# Patient Record
Sex: FEMALE | Race: WHITE | Employment: OTHER | URBAN - NONMETROPOLITAN AREA
[De-identification: names, ages, dates, MRNs, and addresses within clinical notes are randomized per-mention and may not be internally consistent; named-entity substitution may affect disease eponyms.]

---

## 2019-06-03 ENCOUNTER — HOSPITAL ENCOUNTER (OUTPATIENT)
Age: 59
Setting detail: OBSERVATION
Discharge: HOME | End: 2019-06-04
Attending: FAMILY MEDICINE | Admitting: FAMILY MEDICINE
Payer: MEDICARE

## 2019-06-03 PROBLEM — G97.1 SPINAL HEADACHE: Status: ACTIVE | Noted: 2019-06-03

## 2019-06-03 LAB
ANION GAP SERPL CALCULATED.3IONS-SCNC: 13 MMOL/L (ref 7–19)
APTT: 25.8 SEC (ref 26–36.2)
BUN BLDV-MCNC: 8 MG/DL (ref 6–20)
CALCIUM SERPL-MCNC: 8.9 MG/DL (ref 8.6–10)
CHLORIDE BLD-SCNC: 111 MMOL/L (ref 98–111)
CO2: 22 MMOL/L (ref 22–29)
CREAT SERPL-MCNC: 0.8 MG/DL (ref 0.5–0.9)
GFR NON-AFRICAN AMERICAN: >60
GLUCOSE BLD-MCNC: 102 MG/DL (ref 74–109)
HCT VFR BLD CALC: 39.9 % (ref 37–47)
HEMOGLOBIN: 12.8 G/DL (ref 12–16)
INR BLD: 1.1 (ref 0.88–1.18)
MCH RBC QN AUTO: 28.8 PG (ref 27–31)
MCHC RBC AUTO-ENTMCNC: 32.1 G/DL (ref 33–37)
MCV RBC AUTO: 89.7 FL (ref 81–99)
PDW BLD-RTO: 13.2 % (ref 11.5–14.5)
PLATELET # BLD: 236 K/UL (ref 130–400)
PMV BLD AUTO: 10 FL (ref 9.4–12.3)
POTASSIUM SERPL-SCNC: 3.8 MMOL/L (ref 3.5–5)
PROTHROMBIN TIME: 13.6 SEC (ref 12–14.6)
RBC # BLD: 4.45 M/UL (ref 4.2–5.4)
SODIUM BLD-SCNC: 146 MMOL/L (ref 136–145)
TSH SERPL DL<=0.05 MIU/L-ACNC: 3.42 UIU/ML (ref 0.27–4.2)
VITAMIN D 25-HYDROXY: 81.6 NG/ML
WBC # BLD: 9.1 K/UL (ref 4.8–10.8)

## 2019-06-03 PROCEDURE — 36415 COLL VENOUS BLD VENIPUNCTURE: CPT

## 2019-06-03 PROCEDURE — 85730 THROMBOPLASTIN TIME PARTIAL: CPT

## 2019-06-03 PROCEDURE — G0378 HOSPITAL OBSERVATION PER HR: HCPCS

## 2019-06-03 PROCEDURE — 6370000000 HC RX 637 (ALT 250 FOR IP): Performed by: HOSPITALIST

## 2019-06-03 PROCEDURE — 82306 VITAMIN D 25 HYDROXY: CPT

## 2019-06-03 PROCEDURE — 2580000003 HC RX 258: Performed by: PSYCHIATRY & NEUROLOGY

## 2019-06-03 PROCEDURE — 84443 ASSAY THYROID STIM HORMONE: CPT

## 2019-06-03 PROCEDURE — 2500000003 HC RX 250 WO HCPCS: Performed by: PSYCHIATRY & NEUROLOGY

## 2019-06-03 PROCEDURE — 80048 BASIC METABOLIC PNL TOTAL CA: CPT

## 2019-06-03 PROCEDURE — 99226 PR SBSQ OBSERVATION CARE/DAY 35 MINUTES: CPT | Performed by: HOSPITALIST

## 2019-06-03 PROCEDURE — 85610 PROTHROMBIN TIME: CPT

## 2019-06-03 PROCEDURE — 6360000002 HC RX W HCPCS: Performed by: HOSPITALIST

## 2019-06-03 PROCEDURE — 96375 TX/PRO/DX INJ NEW DRUG ADDON: CPT

## 2019-06-03 PROCEDURE — 96365 THER/PROPH/DIAG IV INF INIT: CPT

## 2019-06-03 PROCEDURE — 85027 COMPLETE CBC AUTOMATED: CPT

## 2019-06-03 PROCEDURE — G0379 DIRECT REFER HOSPITAL OBSERV: HCPCS

## 2019-06-03 PROCEDURE — 99220 PR INITIAL OBSERVATION CARE/DAY 70 MINUTES: CPT | Performed by: PSYCHIATRY & NEUROLOGY

## 2019-06-03 RX ORDER — SODIUM CHLORIDE 9 MG/ML
INJECTION, SOLUTION INTRAVENOUS ONCE
Status: COMPLETED | OUTPATIENT
Start: 2019-06-03 | End: 2019-06-03

## 2019-06-03 RX ORDER — BUSPIRONE HYDROCHLORIDE 5 MG/1
10 TABLET ORAL 2 TIMES DAILY
Status: DISCONTINUED | OUTPATIENT
Start: 2019-06-03 | End: 2019-06-04 | Stop reason: HOSPADM

## 2019-06-03 RX ORDER — GABAPENTIN 300 MG/1
300 CAPSULE ORAL 3 TIMES DAILY
Status: DISCONTINUED | OUTPATIENT
Start: 2019-06-03 | End: 2019-06-04 | Stop reason: HOSPADM

## 2019-06-03 RX ORDER — CITALOPRAM 20 MG/1
20 TABLET ORAL DAILY
Status: DISCONTINUED | OUTPATIENT
Start: 2019-06-03 | End: 2019-06-04 | Stop reason: HOSPADM

## 2019-06-03 RX ORDER — LEVOTHYROXINE SODIUM 0.05 MG/1
50 TABLET ORAL DAILY
Status: DISCONTINUED | OUTPATIENT
Start: 2019-06-03 | End: 2019-06-04 | Stop reason: HOSPADM

## 2019-06-03 RX ORDER — ERGOCALCIFEROL 1.25 MG/1
50000 CAPSULE ORAL WEEKLY
Status: DISCONTINUED | OUTPATIENT
Start: 2019-06-03 | End: 2019-06-04 | Stop reason: HOSPADM

## 2019-06-03 RX ORDER — TOPIRAMATE 25 MG/1
50 TABLET ORAL 2 TIMES DAILY
Status: DISCONTINUED | OUTPATIENT
Start: 2019-06-03 | End: 2019-06-04 | Stop reason: HOSPADM

## 2019-06-03 RX ADMIN — LEVOTHYROXINE SODIUM 50 MCG: 50 TABLET ORAL at 17:41

## 2019-06-03 RX ADMIN — GABAPENTIN 300 MG: 300 CAPSULE ORAL at 18:23

## 2019-06-03 RX ADMIN — ERGOCALCIFEROL 50000 UNITS: 1.25 CAPSULE ORAL at 17:41

## 2019-06-03 RX ADMIN — SODIUM CHLORIDE: 9 INJECTION, SOLUTION INTRAVENOUS at 19:00

## 2019-06-03 RX ADMIN — HYDROMORPHONE HYDROCHLORIDE 0.5 MG: 1 INJECTION, SOLUTION INTRAMUSCULAR; INTRAVENOUS; SUBCUTANEOUS at 15:04

## 2019-06-03 RX ADMIN — CITALOPRAM HYDROBROMIDE 20 MG: 20 TABLET ORAL at 17:41

## 2019-06-03 RX ADMIN — CAFFEINE AND SODIUM BENZOATE 500 MG: 125 INJECTION, SOLUTION INTRAMUSCULAR; INTRAVENOUS at 17:41

## 2019-06-03 RX ADMIN — TOPIRAMATE 50 MG: 25 TABLET, FILM COATED ORAL at 21:25

## 2019-06-03 RX ADMIN — GABAPENTIN 300 MG: 300 CAPSULE ORAL at 21:25

## 2019-06-03 RX ADMIN — BUSPIRONE HYDROCHLORIDE 10 MG: 5 TABLET ORAL at 21:25

## 2019-06-03 ASSESSMENT — PAIN SCALES - GENERAL: PAINLEVEL_OUTOF10: 7

## 2019-06-03 NOTE — CONSULTS
swelling or pain. Genitourinary - No difficulty urinating, dysuria  Musculoskeletal - no back pain or myalgia. Skin - no color change or rash  Neurologic - No seizures. No lateralizing weakness. Hematologic - no easy bruising or excessive bleeding. Psychiatric - no severe anxiety or nervousness. All other review of systems are negative. ? Past Medical History:      Diagnosis Date    Arthritis     Depression with anxiety     GERD (gastroesophageal reflux disease)     Hypothyroidism     Migraines      Past Surgical History:      Procedure Laterality Date    BREAST BIOPSY      bilateral biospy = 8 - 9 times    CHOLECYSTECTOMY      COLON SURGERY      scar tissue removed    COLONOSCOPY      ENDOSCOPY, COLON, DIAGNOSTIC      FRACTURE SURGERY      HERNIA REPAIR  05/2016    HYSTERECTOMY      TUBAL LIGATION      UPPER GASTROINTESTINAL ENDOSCOPY       Medications in Hospital:     Current Facility-Administered Medications:     HYDROmorphone (DILAUDID) injection 0.5 mg, 0.5 mg, Intravenous, Q3H PRN, Kitty Fernandez MD, 0.5 mg at 06/03/19 1504    busPIRone (BUSPAR) tablet 10 mg, 10 mg, Oral, BID, Kitty Fernandez MD    citalopram (CELEXA) tablet 20 mg, 20 mg, Oral, Daily, Kitty Fernandez MD    estropipate (OGEN) tablet 0.75 mg, 0.75 mg, Oral, Daily, Kitty Fernandez MD    gabapentin (NEURONTIN) capsule 300 mg, 300 mg, Oral, TID, Kitty Fernandez MD    levothyroxine (SYNTHROID) tablet 50 mcg, 50 mcg, Oral, Daily, Kitty Fernandez MD    topiramate (TOPAMAX) tablet 50 mg, 50 mg, Oral, BID, Kitty Fernandez MD    vitamin D (ERGOCALCIFEROL) capsule 50,000 Units, 50,000 Units, Oral, Weekly, Kitty Fernandez MD  Allergies: Penicillins  Social History:   TOBACCO:  reports that she has never smoked. She has never used smokeless tobacco.  ETOH:  reports that she does not drink alcohol.   Family History:       Problem Relation Age of Onset    Heart Failure Mother            Milagros Borergo Atrial Fibrillation Mother     Cancer Brother         lymphoma    Stroke Maternal Grandmother      ? ? Physical Exam:    Vitals: /80   Pulse 72   Temp 97.7 °F (36.5 °C) (Temporal)   Resp 16   SpO2 93%   Constitutional - well developed, well nourished. Eyes - conjunctiva normal. Pupils react to light  Ear, nose, throat -hearing intact to finger rub No scars, masses, or lesions over external nose or ears, no atrophy of tongue  Neck-symmetric, no masses noted, no jugular vein distension  Respiration- chest wall appears symmetric, good expansion,   normal effort without use of accessory muscles  Musculoskeletal - no significant wasting of muscles noted, no bony deformities  Extremities-no clubbing, cyanosis or edema  Skin - warm, dry, and intact. No rash, erythema, or pallor.   Psychiatric - mood, affect, and behavior appear normal.   Neurological exam  Awake, alert, fluent oriented x 3 appropriate affect  Attention and concentration appear appropriate  Recent and remote memory appears unremarkable  Speech normal without dysarthria  No clear issues with language of fund of knowledge  Cranial Nerve Exam   CN II- Visual fields grossly unremarkable  CN III, IV,VI-EOMI, No nystagmus, conjugate eye movements, no ptosis  CN V-sensation intact to LT over face  CN VII-no facial assymetry  CN VIII-Hearing intact to voice  CN IX and X- Palate elevates in midline  CN XI-good shoulder shrug  CN XII-Tongue midline with no fasciculations or fibrillations  Motor Exam  V/V throughout upper and lower extremities bilaterally, no cogwheeling, normal tone  Sensory Exam  Sensation intact to light touch and temperature upper and lower extremities bilaterally  Reflexes   2+ biceps bilaterally  2+ brachioradialis  2+patella  2+ ankle jerks  No clonus ankles  No Romo's sign bilateral hands  Tremors- no tremors in hands or head noted  Gait  Not tested  Coordination  Finger to nose-unremarkable  ? ?  CBC:   Recent Labs     06/03/19  1307   WBC 9.1   HGB 12.8        BMP:   Recent Labs     06/03/19  1307   *   K 3.8      CO2 22   BUN 8   CREATININE 0.8   GLUCOSE 102     Hepatic: No results for input(s): AST, ALT, ALB, BILITOT, ALKPHOS in the last 72 hours. Lipids: No results for input(s): CHOL, HDL in the last 72 hours. Invalid input(s): LDLCALCU  INR:   Recent Labs     06/03/19  1307   INR 1.10     ?  ? Assessment and Plan   1. Spinal headache post puncture of the dura during an epidural pain block. Her CT scan findings are consistent with that. She otherwise has a nonfocal neurological exam and no signs of meningitis nor any other ominous neurological condition. She will be given IV caffeine tonight and continue on copious IV fluids and pain medication. Should her symptoms be no better in the morning we will try to get the anesthesia service to see her for a blood patch. Discussed with nursing staff and patient and her . This with the ED physician.   ?  ?      Blaze Pike MD  Board Certified in Neurology

## 2019-06-03 NOTE — H&P
History and Physical    Patient Name:  Barbra Aggarwal    :  1960    Chief Complaint:   Headache     History of Present Illness:   Barbra Aggarwal presents to [facility] with 3 day history of headache after epidural for her back pain. Pain in the occipital region, 10 out 10, nothing was making it better, noise and light makes it worse. With assocciated nausea. Pain in sharp and constant. CT of the head was performed. This showed free air in the head consistent with puncture of the dura. She has a history of migraines which are well controlled on Topamax. Her current headache is not like a migraine. There had been no fevers or chills. Denies any neurological deficits. Past Medical History:   has a past medical history of Arthritis, Depression with anxiety, GERD (gastroesophageal reflux disease), Hypothyroidism, and Migraines. Surgical History:   has a past surgical history that includes Colon surgery; hernia repair (2016); Hysterectomy; Cholecystectomy; Upper gastrointestinal endoscopy; Tubal ligation; Colonoscopy; Endoscopy, colon, diagnostic; fracture surgery; and Breast biopsy. Social History:   reports that she has never smoked. She has never used smokeless tobacco. She reports that she does not drink alcohol or use drugs. Family History:  family history includes Atrial Fibrillation in her mother; Cancer in her brother; Heart Failure in her mother; Stroke in her maternal grandmother. Medications:  Prior to Admission medications    Medication Sig Start Date End Date Taking? Authorizing Provider   topiramate (TOPAMAX) 50 MG tablet Take 50 mg by mouth 2 times daily   Yes Historical Provider, MD   citalopram (CELEXA) 20 MG tablet Take 20 mg by mouth daily   Yes Historical Provider, MD   levothyroxine (SYNTHROID) 50 MCG tablet Take 50 mcg by mouth Daily   Yes Historical Provider, MD   gabapentin (NEURONTIN) 300 MG capsule Take 300 mg by mouth 3 times daily.  Patient states that she only takes it twice daily. Yes Historical Provider, MD   estropipate (OGEN) 0.75 MG tablet Take 0.75 mg by mouth daily   Yes Historical Provider, MD   busPIRone (BUSPAR) 10 MG tablet Take 10 mg by mouth 2 times daily   Yes Historical Provider, MD   vitamin D (ERGOCALCIFEROL) 52557 UNITS CAPS capsule Take 50,000 Units by mouth once a week    Historical Provider, MD       Allergies:  Penicillins     Review of Systems:   · Constitutional: there has been no unanticipated weight loss. There's been change in energy level, sleep pattern, or activity level. · Eyes: No visual changes or diplopia. No scleral icterus. · ENT: Headaches, no hearing loss or vertigo. No mouth sores or sore throat. · Cardiovascular: No chest pain, palpitations or loss of consciousness. No pleuritic pain or phlebitis. No orthopnea, PND or peripheral edema. · Respiratory: No cough or wheezing, no sputum production. No hemoptysis. No dyspnea     · Gastrointestinal: No abdominal pain, appetite loss, blood in stools. No change in bowel habits. No V. No blood per rectum. · Genitourinary: No dysuria, trouble voiding, or hematuria. · Musculoskeletal:  No gait disturbance, weakness or joint complaints. · Integumentary: No rash or pruritis. · Neurological: No diplopia, change in muscle strength, numbness or tingling. No change in gait, balance, coordination. · Psychiatric: No anxiety, or depression. · Endocrine: No temperature intolerance. No excessive thirst, fluid intake, or urination. No tremor. · Hematologic/Lymphatic: No abnormal bruising or bleeding, blood clots or swollen lymph nodes. · Allergic/Immunologic: No nasal congestion or hives. Physical Examination:    Vital Signs: /80   Pulse 72   Temp 97.7 °F (36.5 °C) (Temporal)   Resp 16   SpO2 93%   General appearance: Well preserved, mesomorphic body habitus, alert, severe distress. Skin: Skin color, texture, turgor normal. No rashes or lesions.   No induration or tightening palpated. Head: Normocephalic. No masses, lesions, tenderness or abnormalities  Eyes: conjunctivae/corneas clear. EOM's intact. Sclera non icteric. Ears: External ears normal.  Hearing normal to finger rub. Nose/Sinuses: Nares normal. Septum midline. Mucosa normal. No drainage or sinus tenderness. Oropharynx: Lips, mucosa, and tongue normal. Oropharynx clear with no exudate seen. Neck: Neck supple, and symmetric. No adenopathy. Trachea is midline. Carotids brisk in upstroke without bruits, No abnormal JVP noted at 45°. Lungs: Lungs clear to auscultation bilaterally. No retractions or use of accessory muscles. No vocal fremitus. No ronchi, crackle or rale. Heart:  S1 > S2. Regular rate and rhythm. No gallop, murmur, rub, palpable thrill or heave noted. Abdomen: Abdomen soft, non-tender. BS normal. No masses, organomegaly. No hernia noted. Extremities: Extremities normal. No deformities, edema, or skin discoloration. No cyanosis or clubbing noted to the nails. Peripheral pulses 4/4. Musculoskeletal: Spine ROM normal. Muscular strength intact. Neuro: Cranial nerves intact. Motor: Strength 5/5 in all extremities. No focal weakness. Sensory: grossly normal to touch. Pertinent Labs:  CBC:   Recent Labs     06/03/19  1307   WBC 9.1   RBC 4.45   HGB 12.8   HCT 39.9   MCV 89.7   MCH 28.8   MCHC 32.1*   RDW 13.2      MPV 10.0     BMP:   Recent Labs     06/03/19  1307   *   K 3.8      CO2 22   BUN 8   CREATININE 0.8   GLUCOSE 102   CALCIUM 8.9     ABGs: No results for input(s): PO2, PCO2, PH, HCO3, BE, O2SAT in the last 72 hours.   INR:   Recent Labs     06/03/19  1307   INR 1.10   PROTIME 13.6     BNP:  No results found for: BNP  TSH:   Lab Results   Component Value Date    TSH 3.420 06/03/2019     Cardiac Injury Profile: No results found for: CKTOTAL, CKMB, CKMBINDEX, TROPONINI  Lipid Profile: No components found for: CHLPL  No results found for: TRIG  No results found for: HDL  No results found for: LDLCALC  No results found for: LABVLDL  Hemoglobin A1C: No results found for: LABA1C  No results found for: EAG      Assessment/Plan:  · Spinal headache- per neuro caffeine today, dilaudid, hydration- if not better blood patch am   · Migraines - home meds                     I have reviewed my findings and recommendations in detail with Alejandro Esteban.     Shreya Varner MD

## 2019-06-04 VITALS
DIASTOLIC BLOOD PRESSURE: 77 MMHG | RESPIRATION RATE: 14 BRPM | OXYGEN SATURATION: 98 % | TEMPERATURE: 96.9 F | HEART RATE: 72 BPM | SYSTOLIC BLOOD PRESSURE: 118 MMHG

## 2019-06-04 PROBLEM — G89.29 CHRONIC LOW BACK PAIN: Status: ACTIVE | Noted: 2019-06-04

## 2019-06-04 PROBLEM — E87.0 HYPERNATREMIA: Status: ACTIVE | Noted: 2019-06-04

## 2019-06-04 PROBLEM — M54.50 CHRONIC LOW BACK PAIN: Status: ACTIVE | Noted: 2019-06-04

## 2019-06-04 PROCEDURE — 99217 PR OBSERVATION CARE DISCHARGE MANAGEMENT: CPT | Performed by: FAMILY MEDICINE

## 2019-06-04 PROCEDURE — 6370000000 HC RX 637 (ALT 250 FOR IP): Performed by: HOSPITALIST

## 2019-06-04 PROCEDURE — 99225 PR SBSQ OBSERVATION CARE/DAY 25 MINUTES: CPT | Performed by: PSYCHIATRY & NEUROLOGY

## 2019-06-04 PROCEDURE — G0378 HOSPITAL OBSERVATION PER HR: HCPCS

## 2019-06-04 RX ADMIN — TOPIRAMATE 50 MG: 25 TABLET, FILM COATED ORAL at 08:59

## 2019-06-04 RX ADMIN — CITALOPRAM HYDROBROMIDE 20 MG: 20 TABLET ORAL at 08:59

## 2019-06-04 RX ADMIN — LEVOTHYROXINE SODIUM 50 MCG: 50 TABLET ORAL at 06:11

## 2019-06-04 RX ADMIN — BUSPIRONE HYDROCHLORIDE 10 MG: 5 TABLET ORAL at 08:59

## 2019-06-04 RX ADMIN — GABAPENTIN 300 MG: 300 CAPSULE ORAL at 08:59

## 2019-06-04 NOTE — DISCHARGE SUMMARY
cooperative with exam  HEENT: atraumatic, eyes with clear conjunctiva and normal lids, pupils and irises normal, external ears and nose are normal, lips normal. Neck without masses, lympadenopathy, bruit, thyroid normal  Lungs: no increased work of breathing, clear to auscultation bilaterally without rales, rhonchi or wheezes  Heart: regular rate and rhythm, S1, S2 normal, no murmur, click, rub or gallop  Abdomen: soft, non-tender; bowel sounds normal; no masses,  no organomegaly  Extremities: extremities normal without clubbing, atraumatic, no cyanosis or edema  Neurologic: no focal neurologic deficits, normal sensation, alert and oriented, affect and mood appropriate  Skin: no jaundice, rashes, or nodules    Discharge Medications:       Quincy Dickinson 855 Medication Instructions RUB:[id number]    Printed on:06/04/19 1007   Medication Information                      busPIRone (BUSPAR) 10 MG tablet  Take 10 mg by mouth 2 times daily             citalopram (CELEXA) 20 MG tablet  Take 20 mg by mouth daily             diclofenac (FLECTOR) 1.3 % patch  Place 1 patch onto the skin 2 times daily for 3 days             estropipate (OGEN) 0.75 MG tablet  Take 0.75 mg by mouth daily             gabapentin (NEURONTIN) 300 MG capsule  Take 300 mg by mouth 3 times daily. Patient states that she only takes it twice daily. levothyroxine (SYNTHROID) 50 MCG tablet  Take 50 mcg by mouth Daily             topiramate (TOPAMAX) 50 MG tablet  Take 50 mg by mouth 2 times daily             vitamin D (ERGOCALCIFEROL) 50777 UNITS CAPS capsule  Take 50,000 Units by mouth once a week                 Discharge Instructions: Follow up with Maria Teresa Alvarado in 3 days. Take medications as directed. Resume activity as tolerated. Diet: DIET GENERAL;     Disposition: Patient is medically stable and will be discharged Home. Time spent on discharge less than 30 minutes.     Signed:  Brook Hart DO

## 2019-06-04 NOTE — PROGRESS NOTES
Patient:   Nawaf Mac  MR#:    [id number]   Room:    [facility]   YOB: 1960  Date of Progress Note: 6/4/2019  Time of Note                           7:34 AM  Consulting Physician:   Mikael Koyanagi, M.D. Attending Physician:  Galina Blakely, DO       CHIEF COMPLAINT: severe headache  ? Subjective  This 66 y.o. female who presents with a severe headache. 4 days ago she had her 1st and only epidural [facility]. When she awoke she had a headache in the occipital region. Since then she continues to get a headache was described as being severe and sharp pain at the back of her head. As long as she is laying still she is fine but when she gets up and starts moving around and becomes severe and causes significant nausea. She is unable to take pain medications orally since she had some type of the stomach surgery in the past. She went to her local ED 6/3 and a CT of the head was performed. This showed free air in the head consistent with puncture of the dura. The transferring provider did not feel her headache was consistent with a spinal headache although her characteristics are quite typical. She denies any focal signs or symptoms. She has a history of migraines which are well controlled on Topamax. Her admission headache is not like a migraine. There had been no fevers or chills. Since getting IV caffeine last evening she has had no recurrence of her postural headache. Was up this morning gone to the bathroom with no difficulty whatsoever. REVIEW OF SYSTEMS:  Constitutional: No fevers No chills  Neck:No stiffness  Respiratory: No shortness of breath  Cardiovascular: No chest pain No palpitations  Gastrointestinal: No abdominal pain    Genitourinary: No Dysuria  Neurological: No headache, no confusion      PHYSICAL EXAM:  /84   Pulse 59   Temp 96.7 °F (35.9 °C) (Temporal)   Resp 14   SpO2 99%     Constitutional: she appears well-developed and well-nourished. Eyes - conjunctiva normal.  Pupils react to light  Ear, nose, throat -hearing intact to voice. No scars, masses, or lesions over external nose or ears, no atrophy of tongue  Neck-symmetric, no masses noted, no jugular vein distension  Respiration- chest wall appears symmetric, good expansion,   normal effort without use of accessory muscles  Cardiovascular- RRR  Musculoskeletal - no significant wasting of muscles noted, no bony deformities, gait no gross ataxia  Extremities-no clubbing, cyanosis or edema  Skin - warm, dry, and intact. No rash, erythema, or pallor. Psychiatric - mood, affect, and behavior appear normal.      Neurology  NEUROLOGICAL EXAM:      Mental status   Awake, alert, fluent oriented x 3 appropriate affect  Attention and concentration appear appropriate  Recent and remote memory appears unremarkable  Speech normal without dysarthria  No clear issues with language       Cranial Nerves   CN II- Visual fields grossly unremarkable  CN III, IV,VI-EOMI, No nystagmus, conjugate eye movements, no ptosis  CN VII-no facial asymmetry  CN VIII-Hearing intact   CN IX and X- Palate elevates in midline  CN XI-good shoulder shrug  CN XII-Tongue midline with no fasciculations or fibrillations          Motor function  Antigravity x 4     Sensory function Intact to light touch     Cerebellar F-N intact     Tremor None present     Gait                  Not tested           Nursing/pcp notes, imaging,labs and vitals reviewed.      PT,OT and/or speech notes reviewed    Lab Results   Component Value Date    WBC 9.1 06/03/2019    HGB 12.8 06/03/2019    HCT 39.9 06/03/2019    MCV 89.7 06/03/2019     06/03/2019     Lab Results   Component Value Date     (H) 06/03/2019    K 3.8 06/03/2019     06/03/2019    CO2 22 06/03/2019    BUN 8 06/03/2019    CREATININE 0.8 06/03/2019    GLUCOSE 102 06/03/2019    CALCIUM 8.9 06/03/2019    PROT 7.5 05/01/2012    LABALBU 4.8 05/01/2012    ALKPHOS 89 05/01/2012 AST 13 05/01/2012    ALT 8 05/01/2012    LABGLOM >60 06/03/2019     Lab Results   Component Value Date    INR 1.10 06/03/2019   No results found for: PHENYTOIN, ESR, CRP          RECORD REVIEW: Previous medical records, medications were reviewed at today's visit    IMPRESSION:   Spinal headache post puncture of the dura during an epidural pain block. Her CT scan findings are consistent with that. She otherwise has a nonfocal neurological exam and no signs of meningitis nor any other ominous neurological condition. Symptoms appear to have resolved following IV caffeine. She will avoid strenuous activity for next couple of days. Discharge home today okay with me. Follow up as needed.

## 2019-06-10 ENCOUNTER — HOSPITAL ENCOUNTER (INPATIENT)
Age: 59
LOS: 4 days | Discharge: HOME | DRG: 885 | End: 2019-06-14
Attending: PSYCHIATRY & NEUROLOGY | Admitting: PSYCHIATRY & NEUROLOGY
Payer: MEDICARE

## 2019-06-10 DIAGNOSIS — F33.2 MAJOR DEPRESSIVE DISORDER, RECURRENT SEVERE WITHOUT PSYCHOTIC FEATURES (HCC): Primary | ICD-10-CM

## 2019-06-10 PROBLEM — R45.851 DEPRESSION WITH SUICIDAL IDEATION: Status: ACTIVE | Noted: 2019-06-10

## 2019-06-10 PROBLEM — F32.A DEPRESSION WITH SUICIDAL IDEATION: Status: ACTIVE | Noted: 2019-06-10

## 2019-06-10 PROCEDURE — 1240000000 HC EMOTIONAL WELLNESS R&B

## 2019-06-10 PROCEDURE — 6370000000 HC RX 637 (ALT 250 FOR IP): Performed by: PSYCHIATRY & NEUROLOGY

## 2019-06-10 RX ORDER — ACETAMINOPHEN 325 MG/1
650 TABLET ORAL EVERY 4 HOURS PRN
Status: DISCONTINUED | OUTPATIENT
Start: 2019-06-10 | End: 2019-06-14 | Stop reason: HOSPADM

## 2019-06-10 RX ORDER — BUSPIRONE HYDROCHLORIDE 10 MG/1
10 TABLET ORAL ONCE
Status: COMPLETED | OUTPATIENT
Start: 2019-06-10 | End: 2019-06-10

## 2019-06-10 RX ORDER — TOPIRAMATE 25 MG/1
50 TABLET ORAL 2 TIMES DAILY
Status: DISCONTINUED | OUTPATIENT
Start: 2019-06-10 | End: 2019-06-14 | Stop reason: HOSPADM

## 2019-06-10 RX ADMIN — TOPIRAMATE 50 MG: 25 TABLET, FILM COATED ORAL at 21:25

## 2019-06-10 RX ADMIN — BUSPIRONE HYDROCHLORIDE 10 MG: 10 TABLET ORAL at 21:25

## 2019-06-10 ASSESSMENT — SLEEP AND FATIGUE QUESTIONNAIRES
SLEEP PATTERN: DIFFICULTY FALLING ASLEEP;RESTLESSNESS
DO YOU USE A SLEEP AID: NO
DO YOU HAVE DIFFICULTY SLEEPING: YES
AVERAGE NUMBER OF SLEEP HOURS: 5
DIFFICULTY FALLING ASLEEP: YES
DIFFICULTY STAYING ASLEEP: YES
RESTFUL SLEEP: NO
DIFFICULTY ARISING: YES

## 2019-06-10 ASSESSMENT — PAIN SCALES - GENERAL: PAINLEVEL_OUTOF10: 0

## 2019-06-10 ASSESSMENT — LIFESTYLE VARIABLES: HISTORY_ALCOHOL_USE: NO

## 2019-06-10 ASSESSMENT — PATIENT HEALTH QUESTIONNAIRE - PHQ9: SUM OF ALL RESPONSES TO PHQ QUESTIONS 1-9: 8

## 2019-06-10 NOTE — PROGRESS NOTES
[facility] Admission From   Nursing Admission Note              Patient Active Problem List   Diagnosis    Family history of coronary artery disease in mother    Family history of coronary artery disease in brother    Abnormal stress test    Ischemic chest pain    Premature ventricular contraction on electrocardiogram    Spinal headache    Chronic low back pain    Hypernatremia    Depression with suicidal ideation       Pt admitted from 's care at [facility]  to [facility] room # 620. Arrived on unit via [facility] with staff escort. Pt appropriately attired in paper scrubs. Body assessment completed by RN with no contraband discovered. All tubes, lines, and drains were appropriately discontinued by RN staff prior to pt transfer to [facility]. Pt belongings and valuables inventoried and cataloged, stored per policy. Pt oriented to surroundings, program expectations, and copy pt rights given. Received admit packet: [facility], Visitation Info, Fall Prevention, Restraints Info. Consents reviewed, signed Pt Rights, Handbook Acceptance, Visit/Call Acceptance, PHI Release, Social Info Release, and Treatment Agreement. Pt verbalizes understanding. Identifies stressors. Pt c/o constant headaches stating \"I would rather die than hurt this bad\".          C/o:    Notes:

## 2019-06-11 PROBLEM — F33.2 MAJOR DEPRESSIVE DISORDER, RECURRENT SEVERE WITHOUT PSYCHOTIC FEATURES (HCC): Status: ACTIVE | Noted: 2019-06-11

## 2019-06-11 LAB
CHOLESTEROL, TOTAL: 165 MG/DL (ref 160–199)
HBA1C MFR BLD: 5.2 % (ref 4–6)
HDLC SERPL-MCNC: 56 MG/DL (ref 65–121)
LDL CHOLESTEROL CALCULATED: 94 MG/DL
TRIGL SERPL-MCNC: 75 MG/DL (ref 0–149)
TSH REFLEX FT4: 3.47 UIU/ML (ref 0.35–5.5)
VITAMIN B-12: 313 PG/ML (ref 211–946)
VITAMIN D 25-HYDROXY: 92.2 NG/ML

## 2019-06-11 PROCEDURE — 80061 LIPID PANEL: CPT

## 2019-06-11 PROCEDURE — 6370000000 HC RX 637 (ALT 250 FOR IP): Performed by: PSYCHIATRY & NEUROLOGY

## 2019-06-11 PROCEDURE — 84443 ASSAY THYROID STIM HORMONE: CPT

## 2019-06-11 PROCEDURE — 82607 VITAMIN B-12: CPT

## 2019-06-11 PROCEDURE — 1240000000 HC EMOTIONAL WELLNESS R&B

## 2019-06-11 PROCEDURE — 36415 COLL VENOUS BLD VENIPUNCTURE: CPT

## 2019-06-11 PROCEDURE — 83036 HEMOGLOBIN GLYCOSYLATED A1C: CPT

## 2019-06-11 PROCEDURE — 82306 VITAMIN D 25 HYDROXY: CPT

## 2019-06-11 PROCEDURE — 99223 1ST HOSP IP/OBS HIGH 75: CPT | Performed by: PSYCHIATRY & NEUROLOGY

## 2019-06-11 RX ORDER — MIRTAZAPINE 15 MG/1
15 TABLET, FILM COATED ORAL NIGHTLY
Status: DISCONTINUED | OUTPATIENT
Start: 2019-06-11 | End: 2019-06-14 | Stop reason: HOSPADM

## 2019-06-11 RX ORDER — HYDROCHLOROTHIAZIDE 25 MG/1
25 TABLET ORAL DAILY
Status: ON HOLD | COMMUNITY
End: 2019-06-13 | Stop reason: HOSPADM

## 2019-06-11 RX ORDER — BUPROPION HYDROCHLORIDE 100 MG/1
100 TABLET, EXTENDED RELEASE ORAL 2 TIMES DAILY
Status: DISCONTINUED | OUTPATIENT
Start: 2019-06-11 | End: 2019-06-14 | Stop reason: HOSPADM

## 2019-06-11 RX ORDER — LEVOTHYROXINE SODIUM 0.05 MG/1
25 TABLET ORAL DAILY
Status: DISCONTINUED | OUTPATIENT
Start: 2019-06-11 | End: 2019-06-14 | Stop reason: HOSPADM

## 2019-06-11 RX ORDER — ERGOCALCIFEROL 1.25 MG/1
50000 CAPSULE ORAL WEEKLY
Status: DISCONTINUED | OUTPATIENT
Start: 2019-06-11 | End: 2019-06-14 | Stop reason: HOSPADM

## 2019-06-11 RX ORDER — CITALOPRAM 20 MG/1
40 TABLET ORAL DAILY
Status: DISCONTINUED | OUTPATIENT
Start: 2019-06-11 | End: 2019-06-14 | Stop reason: HOSPADM

## 2019-06-11 RX ORDER — PROMETHAZINE HYDROCHLORIDE 25 MG/1
25 TABLET ORAL
Status: ON HOLD | COMMUNITY
End: 2019-06-13 | Stop reason: HOSPADM

## 2019-06-11 RX ORDER — CITALOPRAM 20 MG/1
20 TABLET ORAL DAILY
Status: DISCONTINUED | OUTPATIENT
Start: 2019-06-11 | End: 2019-06-11

## 2019-06-11 RX ORDER — BUSPIRONE HYDROCHLORIDE 15 MG/1
15 TABLET ORAL 2 TIMES DAILY
Status: DISCONTINUED | OUTPATIENT
Start: 2019-06-11 | End: 2019-06-14 | Stop reason: HOSPADM

## 2019-06-11 RX ADMIN — TOPIRAMATE 50 MG: 25 TABLET, FILM COATED ORAL at 20:31

## 2019-06-11 RX ADMIN — BUSPIRONE HYDROCHLORIDE 15 MG: 15 TABLET ORAL at 20:33

## 2019-06-11 RX ADMIN — CITALOPRAM HYDROBROMIDE 40 MG: 20 TABLET ORAL at 16:27

## 2019-06-11 RX ADMIN — TOPIRAMATE 50 MG: 25 TABLET, FILM COATED ORAL at 08:48

## 2019-06-11 RX ADMIN — BUPROPION HYDROCHLORIDE 100 MG: 100 TABLET, FILM COATED, EXTENDED RELEASE ORAL at 20:33

## 2019-06-11 RX ADMIN — ERGOCALCIFEROL 50000 UNITS: 1.25 CAPSULE ORAL at 16:27

## 2019-06-11 RX ADMIN — LEVOTHYROXINE SODIUM 25 MCG: 50 TABLET ORAL at 16:28

## 2019-06-11 RX ADMIN — MIRTAZAPINE 15 MG: 15 TABLET, FILM COATED ORAL at 20:33

## 2019-06-11 ASSESSMENT — PAIN SCALES - GENERAL: PAINLEVEL_OUTOF10: 4

## 2019-06-11 NOTE — PLAN OF CARE
Problem: Depressive Behavior With or Without Suicide Precautions:  Goal: Able to verbalize acceptance of life and situations over which he or she has no control  Description  Able to verbalize acceptance of life and situations over which he or she has no control  Outcome: Ongoing  Note:                                                                       Group Therapy Note    Date: 6/11/2019  Start Time: 1000  End Time:    Number of Participants: 0    Type of Group: Psychotherapy    Patient's Goal: Pt will attend group as scheduled, identify an issue pt would like to work on regarding bettering relationships with others and/or self, pt will be participate in group discussion. Notes: SW invited and encouraged pt to attend group, pt refused/declined. SW gently reminded pt that group is a part of treatment, pt continued to decline, and SW informed nursing staff of non-compliance. Alternative therapeutic educational coping skills activity was provided. Status After Intervention:      Participation Level: None    Participation Quality:       Speech:         Thought Process/Content:       Affective Functioning:       Mood:       Level of consciousness:        Response to Learning:       Endings:     Modes of Intervention:       Discipline Responsible: /Counselor      Signature:  Jose Luis Acevedo [facility]

## 2019-06-11 NOTE — PROGRESS NOTES
SW completed psychosocial and CSSR-S on this date. Pt long and short term goals discussed. Pt voiced understanding. Treatment plan sheet signed. Pt verbalized understanding of the treatment plan. Pt participated in goals and objectives of the treatment plan. It was identified that pt will require outpatient follow up appointments at local [facility] behavioral health facility such as[address]. Pt validated need for appointments. Pt was also provided a handout of contact information for drug and alcohol treatment centers and other community support service such as [facility] and [facility]. Pt denies use of substances.      In the last 6 months has the pt been danger to self: Yes  In the last 6 months has the pt been danger to others: No     Provided pt with [employer] handout entitled \"Quitting Smoking,\" reviewed handout with pt addressing dangers of smoking, developing coping skills, and providing basic information about quitting. Patient refused/declined practical counseling of tobacco practical counseling during the hospital stay.      Electronically signed by Sarah Thomson [facility] on 6/11/2019 at 11:58 AM
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
## 2019-06-11 NOTE — PROGRESS NOTES
Admission Note    Reason for admission/Target Symptom: Patient admitted to [facility] due to \"Pt c/o constant headaches stating \"I would rather die than hurt this bad\". Diagnoses: Depression NOS   UDS: None specified  BAL:  None specified    SW met with treatment team to discuss patient's treatment including care planning, discharge planning, and follow-up needs. Pt has been admitted to [facility]. Treatment team has identified patient's discharge needs as medication management and outpatient therapy/counseling. Pt confirmed  the need for ongoing treatment post inpatient stay. Pt was also provided a handout of contact information for drug and alcohol treatment centers and other community support service such as [facility], AA, and [facility].      Electronically signed by Rohit Victor, [id number] on 6/11/2019 at 12:01 PM

## 2019-06-11 NOTE — H&P
HISTORY and PHYSICAL      CHIEF COMPLAINT:  Depression, SI    Reason for Admission:  Depression, SI    History Obtained From:  patient    HISTORY OF PRESENT ILLNESS:      The patient is a 66 y.o. female who is admitted to the [place] [address] unit with worsening mood issues. She has no c/o CP or SOA. No abdominal pain or N/V. No dysuria. No weakness or HA. No new pain complaints. No fevers. She has had a poor appetite. Past Medical History:        Diagnosis Date    Arthritis     Depression with anxiety     GERD (gastroesophageal reflux disease)     Hypothyroidism     Migraines      Past Surgical History:        Procedure Laterality Date    BREAST BIOPSY      bilateral biospy = 8 - 9 times    CHOLECYSTECTOMY      COLON SURGERY      scar tissue removed    COLONOSCOPY      ENDOSCOPY, COLON, DIAGNOSTIC      FRACTURE SURGERY      HERNIA REPAIR  05/2016    HYSTERECTOMY      TUBAL LIGATION      UPPER GASTROINTESTINAL ENDOSCOPY           Medications Prior to Admission:    Medications Prior to Admission: diclofenac (FLECTOR) 1.3 % patch, Place 1 patch onto the skin 2 times daily for 3 days  topiramate (TOPAMAX) 50 MG tablet, Take 50 mg by mouth 2 times daily  citalopram (CELEXA) 20 MG tablet, Take 20 mg by mouth daily  levothyroxine (SYNTHROID) 50 MCG tablet, Take 50 mcg by mouth Daily  gabapentin (NEURONTIN) 300 MG capsule, Take 300 mg by mouth 3 times daily. Patient states that she only takes it twice daily. vitamin D (ERGOCALCIFEROL) 23330 UNITS CAPS capsule, Take 50,000 Units by mouth once a week  estropipate (OGEN) 0.75 MG tablet, Take 0.75 mg by mouth daily  busPIRone (BUSPAR) 10 MG tablet, Take 10 mg by mouth 2 times daily    Allergies:  Penicillins    Social History:   TOBACCO:   reports that she has never smoked. She has never used smokeless tobacco.  ETOH:   reports that she does not drink alcohol.   DRUGS:   reports that she does not use drugs.  MARITAL STATUS:    OCCUPATION:  Not working  Patient currently lives with family       Family History:       Problem Relation Age of Onset    Heart Failure Mother            [facility] Atrial Fibrillation Mother     Cancer Brother         lymphoma    Stroke Maternal Grandmother      REVIEW OF SYSTEMS:  Constitutional: neg  CV: neg  Pulmonary: neg  GI: neg  : neg  Psych: depression, SI  Neuro: neg  Skin: neg  MusculoSkeletal: chronic back pain  HEENT: neg  Joints: neg    Vitals:  BP (!) 141/92   Pulse 74   Temp 98.5 °F (36.9 °C) (Temporal)   Resp 16   Ht 5' 6\" (1.676 m)   Wt 103 lb (46.7 kg)   SpO2 96%   BMI 16.62 kg/m²     PHYSICAL EXAM:  Gen: NAD, alert  HEENT: WNL  Lymph: no LAD  Neck: no JVD or masses  Chest: CTA bilat  CV: RRR  Abdomen: NT/ND  Extrem: no C/C/E  Neuro: non focal  Skin: no rashes  Joints: no redness    DATA:  I have reviewed the admission labs and imaging tests.     ASSESSMENT AND PLAN:      Patient Active Hospital Problem List:   Depression with suicidal ideation---follow with Psych   Hypothyroidism--follow with PO replacement   GERD--no symptoms   Elevated BP--monitor BP        Seferino Umanzor MD  2:37 AM 2019

## 2019-06-11 NOTE — PROGRESS NOTES
[facility] Adult Unit Daily Assessment  Nursing Progress Note    Room: [facility]/620-01   Name: Niki Perla   Age: 66 y.o. Gender: female   Dx: <principal problem not specified>  Precautions: suicide risk and fall risk  Inpatient Status: voluntary       Sleep: Yes,   Sleep Quality Good   Hours Slept: see rounding flowsheet, asleep since 2300   Sleep Medications: No, received Buspar 10mg at HS  PRN Sleep Meds: No       Other PRN Meds: No   Med Compliant: Yes   Accu-Chek: No   Oxygen: No  CIWA/CINA: No          SI denies suicidal ideation    HI Negative for homicidal ideation        AVH:Absent      Depression: 6   Anxiety: 4       Appetite: improved, 'I just ate part of a [place] sandwich'   Social: No   Speech: normal   Appearance: appropriately dressed and healthy looking    Group Participation: Yes  Participation LevelActive Listener    Participation Yolande Beltran, completed wrap-up    Notes: Pt pleasant. Pt remained in her room most of the evening. Pt talked about 'going to pain clinic for herniated discs in lower back. Got an epidural for steroid injection. Got really sick afterwards & got a blood patch Friday.' 'I just felt so bad & I wanted it to stop.' Pt reports 'feeling little better, this turkey is the first thing I've ate in days.' No c/o's voiced. Will continue to monitor.     Electronically signed by Eleanor Apgar, RN on 2019 at 12:45 AM

## 2019-06-11 NOTE — PROGRESS NOTES
SW attempted to call pt's  Lashonda Arboleda (release signed) [phone] to obtain collateral information. However, a recording came on saying the [address] customer is not available. CARL also attempted to call pt's  Lashonda Arboleda (release signed) on other phone number in chart [phone] [facility]) to obtain collateral information. However, this writer received voicemail, left contact information requesting a call back.      Electronically signed by Sarah Thomson [facility] on 6/11/2019 at 11:29 AM

## 2019-06-11 NOTE — PLAN OF CARE
Problem: Depressive Behavior With or Without Suicide Precautions:  Goal: Able to verbalize acceptance of life and situations over which he or she has no control  Description  Able to verbalize acceptance of life and situations over which he or she has no control  6/11/2019 1554 by Balaji To  Outcome: Ongoing  Note:                                                                       Group Therapy Note    Date: 6/11/2019  Start Time: 1430  End Time:  9223  Number of Participants: 4    Type of Group: Psychoeducation    Wellness Binder Information  Module Name:  Men's Issues  Session Number:  1    Patient's Goal:  To improve knowledge of effective stress management techniques    Notes:  Pt demonstrated improved knowledge of effective stress management techniques by actively participating in group discussion.     Status After Intervention:  Unchanged    Participation Level: None    Participation Quality: Resistant      Speech:  hesitant      Thought Process/Content: Linear      Affective Functioning: Flat      Mood: anxious and depressed      Level of consciousness:  Alert and Oriented x4      Response to Learning: Able to verbalize current knowledge/experience, Able to verbalize/acknowledge new learning and Progressing to goal      Endings: None Reported    Modes of Intervention: Education      Discipline Responsible: Psychoeducational Specialist      Signature:  Balaji To

## 2019-06-11 NOTE — PROGRESS NOTES
[facility] Daily Shift Assessment  Nursing Progress Note    Room: 0620/620-01 Name: William Blackmon Age: 66 y.o. Gender: female   Dx: <principal problem not specified>  Precautions: suicide risk and fall risk  Target Symptoms:   Accu-Chek: NoSleep: Yes,Sleep Quality Fair SI denies AVH denies [facility]  ADLs: Yes Speech: normal Depression: 6 Anxiety: 4   Participation LevelActive Listener  Visitation: No   Participation QualityAppropriate    Complaints: pt has no complaints at this time. Will continue to monitor.       Signature: Bret Frances

## 2019-06-12 PROCEDURE — 6370000000 HC RX 637 (ALT 250 FOR IP): Performed by: PSYCHIATRY & NEUROLOGY

## 2019-06-12 PROCEDURE — 99233 SBSQ HOSP IP/OBS HIGH 50: CPT | Performed by: PSYCHIATRY & NEUROLOGY

## 2019-06-12 PROCEDURE — 1240000000 HC EMOTIONAL WELLNESS R&B

## 2019-06-12 RX ORDER — DRONABINOL 2.5 MG/1
2.5 CAPSULE ORAL 2 TIMES DAILY
Status: DISCONTINUED | OUTPATIENT
Start: 2019-06-12 | End: 2019-06-12

## 2019-06-12 RX ORDER — DRONABINOL 2.5 MG/1
2.5 CAPSULE ORAL 2 TIMES DAILY
Status: DISCONTINUED | OUTPATIENT
Start: 2019-06-12 | End: 2019-06-14 | Stop reason: HOSPADM

## 2019-06-12 RX ADMIN — TOPIRAMATE 50 MG: 25 TABLET, FILM COATED ORAL at 20:53

## 2019-06-12 RX ADMIN — BUPROPION HYDROCHLORIDE 100 MG: 100 TABLET, FILM COATED, EXTENDED RELEASE ORAL at 08:39

## 2019-06-12 RX ADMIN — LEVOTHYROXINE SODIUM 25 MCG: 50 TABLET ORAL at 05:57

## 2019-06-12 RX ADMIN — DRONABINOL 2.5 MG: 2.5 CAPSULE ORAL at 16:28

## 2019-06-12 RX ADMIN — BUSPIRONE HYDROCHLORIDE 15 MG: 15 TABLET ORAL at 20:53

## 2019-06-12 RX ADMIN — MIRTAZAPINE 15 MG: 15 TABLET, FILM COATED ORAL at 20:54

## 2019-06-12 RX ADMIN — BUSPIRONE HYDROCHLORIDE 15 MG: 15 TABLET ORAL at 08:40

## 2019-06-12 RX ADMIN — BUPROPION HYDROCHLORIDE 100 MG: 100 TABLET, FILM COATED, EXTENDED RELEASE ORAL at 20:53

## 2019-06-12 RX ADMIN — TOPIRAMATE 50 MG: 25 TABLET, FILM COATED ORAL at 08:39

## 2019-06-12 RX ADMIN — CITALOPRAM HYDROBROMIDE 40 MG: 20 TABLET ORAL at 08:39

## 2019-06-12 NOTE — PROGRESS NOTES
Department of Psychiatry  Attending Progress Note - Geriatric      SUBJECTIVE:    The patient is a 66 y.o. female with previous psychiatric history of depression and anxiety who has been admitted to psychiatric unit from [facility] due to suicidal attempt by overdose of prescribed Vistaril. Patient has been seen in my office with presence of . Patient stated that she is doing slightly better and her mood is \"better\" today as well. She endorses improved quality of sleep, stated that she slept 8 hours during the last night. Patient continues to endorse poor appetite, stated that she ate a small portion of her breakfast today. She still reports mild feeling of nausea, which prevented her from eating the food. She is compliant with currently prescribed medications, denies any side effects, endorses beneficial effect of psychotropic medications for her mood, stated that depression and anxiety significantly decreased. Patient graded level of her depression as 4 out of 10, and anxiety as 5 out of 10, with 10 being the worst.  Patient attends and participates in group activities in the unit. She is social with medical staff, not social with other patients in the unit. Patient performs her ADLs. Behaviorally she is appropriate. Currently she denies any active suicidal or homicidal ideations, denies any plans. Also, patient denies any auditory and visual hallucinations. OBJECTIVE    Physical  VITALS:  BP (!) 120/93   Pulse 84   Temp 98.1 °F (36.7 °C) (Temporal)   Resp 16   Ht 5' 6\" (1.676 m)   Wt 103 lb (46.7 kg)   SpO2 94%   BMI 16.62 kg/m²   TEMPERATURE:  Current - Temp: 98.1 °F (36.7 °C);  Max - Temp  Av °F (36.7 °C)  Min: 97.9 °F (36.6 °C)  Max: 98.1 °F (36.7 °C)  RESPIRATIONS RANGE: Resp  Av  Min: 16  Max: 16  PULSE RANGE: Pulse  Av.5  Min: 73  Max: 84  BLOOD PRESSURE RANGE:  Systolic (99PZL), DVO:356 , Min:120 , VYD:931   ; Diastolic (54OBZ), RHV:37, Min:85, Max:93    Review of Systems: 14 point review of systems is negative    Psychological ROS: positive for mild anxiety and mild depression. Mental Status Examination:   Appearance: Appropriately groomed and in hospital attire. Made good eye contact. Behavior: Calm, cooperative. No psychomotor retardation/agitation appreciated. Gait unremarkable. Speech: Normal in tone, volume, and quality. No slurring, dysarthria or   pressured speech noted. Mood: \"better\"   Affect: Mood congruent. Range is flat. Thought Process: Appears linear and goal oriented. .   Thought Content: Patient does not have any current active suicidal and   homicidal ideations. Perceptions: Seems patient does not have any auditory or visual hallucinations at present time. Patient did not appear to be responding to internal stimuli. Orientation: to person, place, date, and situation. Alert. Language: Intact. Fund of information: Intact. Memory: recent and remote appear intact. Impulsivity: Improved. Neurovegitative: Decreased appetite, improved quality of sleep. Insight: Impaired. Judgment: Improved.       Data  Lab Results   Component Value Date    TSHFT4 3.47 06/11/2019    TSH 3.420 06/03/2019     Lab Results   Component Value Date    HITVIKES07 313 06/11/2019     Lab Results   Component Value Date    VITD25 92.2 06/11/2019       Medications  Current Facility-Administered Medications: levothyroxine (SYNTHROID) tablet 25 mcg, 25 mcg, Oral, Daily  busPIRone (BUSPAR) tablet 15 mg, 15 mg, Oral, BID  citalopram (CELEXA) tablet 40 mg, 40 mg, Oral, Daily  mirtazapine (REMERON) tablet 15 mg, 15 mg, Oral, Nightly  buPROPion (WELLBUTRIN SR) extended release tablet 100 mg, 100 mg, Oral, BID  vitamin D (ERGOCALCIFEROL) capsule 50,000 Units, 50,000 Units, Oral, Weekly  acetaminophen (TYLENOL) tablet 650 mg, 650 mg, Oral, Q4H PRN  magnesium hydroxide (MILK OF MAGNESIA) 400 MG/5ML suspension 30 mL, 30 mL, Oral, Daily PRN  topiramate

## 2019-06-12 NOTE — PLAN OF CARE
Problem: Depressive Behavior With or Without Suicide Precautions:  Goal: Able to verbalize acceptance of life and situations over which he or she has no control  Description  Able to verbalize acceptance of life and situations over which he or she has no control  6/12/2019 1508 by Danyelle Mendez RN  Outcome: Ongoing  6/12/2019 1326 by Lakshmi Prater  Outcome: Ongoing  Note:                                                                       Group Therapy Note    Date: 6/12/2019  Start Time: 1100  End Time:  1200  Number of Participants: 3    Type of Group: Psychoeducation    Wellness Binder Information  Module Name:  Happiness  Session Number:  3    Patient's Goal:  To improve knowledge of strategies to enhance happiness    Notes:  Pt demonstrated improved knowledge of strategies to enhance happiness by actively participating in group discussion.     Status After Intervention:  Unchanged    Participation Level: Interactive    Participation Quality: Attentive      Speech:  normal      Thought Process/Content: Logical      Affective Functioning: Congruent      Mood: anxious and depressed      Level of consciousness:  Alert and Oriented x4      Response to Learning: Able to verbalize current knowledge/experience, Able to verbalize/acknowledge new learning and Progressing to goal      Endings: None Reported    Modes of Intervention: Education      Discipline Responsible: Psychoeducational Specialist      Signature:  Lakshmi Prater

## 2019-06-12 NOTE — PROGRESS NOTES
Progress Note  Alma Delia Agosto  6/11/2019 11:34 PM  Subjective:   Admit Date:   6/10/2019      CC/ADMIT DX:       Interval History:   Reviewed overnight events and nursing notes. No new physical complaints. I have reviewed all labs/diagnostics from the last 24hrs. ROS:   I have done a 10 point ROS and all are negative, except what is mentioned in the HPI. DIET GENERAL;    Medications:      levothyroxine  25 mcg Oral Daily    busPIRone  15 mg Oral BID    citalopram  40 mg Oral Daily    mirtazapine  15 mg Oral Nightly    buPROPion  100 mg Oral BID    vitamin D  50,000 Units Oral Weekly    topiramate  50 mg Oral BID           Objective:   Vitals: /85   Pulse 73   Temp 97.9 °F (36.6 °C) (Temporal)   Resp 16   Ht 5' 6\" (1.676 m)   Wt 103 lb (46.7 kg)   SpO2 97%   BMI 16.62 kg/m²  No intake or output data in the 24 hours ending 06/11/19 2334  General appearance: alert and cooperative with exam  Lungs: clear to auscultation bilaterally  Heart: RRR  Abdomen: soft, non-tender; bowel sounds normal; no masses,  no organomegaly  Extremities: extremities normal, atraumatic, no cyanosis or edema  Neurologic:  No obvious focal neurologic deficits. Assessment and Plan: Active Problems:    Depression with suicidal ideation    Major depressive disorder, recurrent severe without psychotic features ([address]  Resolved Problems:    * No resolved hospital problems. *    Elevated BP       Plan:  1. Continue present medication(s)   2. Watch BP closely. It has been elevated at times. 3.  Follow with Psych      Discharge planning:   her home     Reviewed treatment plans with the patient and/or family.              Electronically signed by Monty Cheek MD on 6/11/2019 at 11:34 PM

## 2019-06-12 NOTE — BH NOTE
Group Therapy Note    Date: 6/12/2019  Start Time: 1600  End Time:  0769  Number of Participants: 4    Type of Group: Psychoeducation    Wellness Binder Information  Module Name:  Med Ed  Session Number:  1    Patient's Goal:  Understanding of medication and its effects    Notes:  Pt med sheet print out provided    Status After Intervention:  Unchanged    Participation Level: Active Listener    Participation Quality: Appropriate      Speech:  normal      Thought Process/Content: Logical      Affective Functioning: Flat      Mood: depressed      Level of consciousness:  Alert and Oriented x4      Response to Learning: Able to verbalize current knowledge/experience      Endings: None Reported    Modes of Intervention: Education and Support      Discipline Responsible: Registered Nurse      Signature:   Diana Hein RN

## 2019-06-12 NOTE — PLAN OF CARE
Problem: Depressive Behavior With or Without Suicide Precautions:  Goal: Able to verbalize acceptance of life and situations over which he or she has no control  Description  Able to verbalize acceptance of life and situations over which he or she has no control  6/12/2019 1556 by Bijal Robles  Outcome: Ongoing  Note:                                                                       Group Therapy Note    Date: 6/12/2019  Start Time: 1400  End Time:  1530  Number of Participants: 3    Type of Group: Cognitive Skills    Wellness Binder Information  Module Name:  [address] [place] [address]  Session Number:  1    Patient's Goal:  To increase knowledge of practical facts about depression    Notes:  Pt demonstrated improved knowledge of practical facts about depression by actively participating in group discussion.     Status After Intervention:  Unchanged    Participation Level: Interactive    Participation Quality: Attentive      Speech:  normal      Thought Process/Content: Logical      Affective Functioning: Congruent      Mood: anxious and depressed      Level of consciousness:  Alert and Oriented x4      Response to Learning: Able to verbalize current knowledge/experience, Able to verbalize/acknowledge new learning and Progressing to goal      Endings: None Reported    Modes of Intervention: Education      Discipline Responsible: Psychoeducational Specialist      Signature:  Bijal Robles

## 2019-06-12 NOTE — PROGRESS NOTES
Nutrition Assessment    Type and Reason for Visit: Initial, Positive Nutrition Screen    Nutrition Recommendations: Ensure Enlive TID    Nutrition Assessment: Pt is nutritionally compromised AEB BMI. Pt is at risk for further risk d/t inadequate oral intake. Will start ONS TID. Malnutrition Assessment:  · Malnutrition Status: At risk for malnutrition  · Context: Social or environmental circumstances  · Findings of the 6 clinical characteristics of malnutrition (Minimum of 2 out of 6 clinical characteristics is required to make the diagnosis of moderate or severe Protein Calorie Malnutrition based on AND/ASPEN Guidelines):  1. Energy Intake-Less than or equal to 50% of estimated energy requirement, Unable to assess    2. Weight Loss-Unable to assess,    3. Fat Loss-Unable to assess,    4. Muscle Loss-Unable to assess,    5. Fluid Accumulation-No significant fluid accumulation,    6.   Strength-Not measured    Nutrition Risk Level: High    Nutrient Needs:  · Estimated Daily Total Kcal: 1326-5755 kcals/day  · Estimated Daily Protein (g): 69-92 g/PRO/day  · Estimated Daily Total Fluid (ml/day): 2020-9727 mL/day    Nutrition Diagnosis:   · Problem: Inadequate oral intake  · Etiology: related to Acute injury/trauma     Signs and symptoms:  as evidenced by Intake 0-25%, BMI    Objective Information:  · Current Nutrition Therapies:  · Oral Diet Orders: General   · Oral Diet intake: 1-25%  · Anthropometric Measures:  · Ht: 5' 6\" (167.6 cm)   · Current Body Wt: 102 lb 2 oz (46.3 kg)  · BMI Classification: BMI <18.5 Underweight    Nutrition Interventions:   Continue current diet, Start ONS  Continued Inpatient Monitoring    Nutrition Evaluation:   · Evaluation: Goals set   · Goals: Pt will consume 50% or more of meals and supplements to promote desired wt gain    · Monitoring: Meal Intake, Weight, Pertinent Labs      Electronically signed by William Singletary RD, LD on 6/12/19 at 3:04 PM    Contact Number: [phone]

## 2019-06-12 NOTE — PLAN OF CARE
Problem: Depressive Behavior With or Without Suicide Precautions:  Goal: Able to verbalize acceptance of life and situations over which he or she has no control  Description  Able to verbalize acceptance of life and situations over which he or she has no control  Outcome: Ongoing  Note:                                                                       Group Therapy Note    Date: 6/12/2019  Start Time: 1100  End Time:  1200  Number of Participants: 3    Type of Group: Psychoeducation    Wellness Binder Information  Module Name:  Happiness  Session Number:  3    Patient's Goal:  To improve knowledge of strategies to enhance happiness    Notes:  Pt demonstrated improved knowledge of strategies to enhance happiness by actively participating in group discussion.     Status After Intervention:  Unchanged    Participation Level: Interactive    Participation Quality: Attentive      Speech:  normal      Thought Process/Content: Logical      Affective Functioning: Congruent      Mood: anxious and depressed      Level of consciousness:  Alert and Oriented x4      Response to Learning: Able to verbalize current knowledge/experience, Able to verbalize/acknowledge new learning and Progressing to goal      Endings: None Reported    Modes of Intervention: Education      Discipline Responsible: Psychoeducational Specialist      Signature:  Ashley Zelaya

## 2019-06-12 NOTE — PLAN OF CARE
Nutrition Problem: Inadequate oral intake  Intervention: Food and/or Nutrient Delivery: Continue current diet, Start ONS  Nutritional Goals: Pt will consume 50% or more of meals and supplements to promote desired wt gain

## 2019-06-12 NOTE — PROGRESS NOTES
SW met with treatment team to discuss pt's progress and setbacks. SW 2 was present. Pt reportedly slept fair last night, with medications, appetite has improved, attends scheduled group activities, minimally social with peers/staff, performs ADL's, compliant with medications, behavior has been quiet, constricted, affect is flat, reports moderate depression, mild anxiety, denies SI/HI/AVH, continue current treatment plan.

## 2019-06-12 NOTE — PLAN OF CARE
Problem: Depressive Behavior With or Without Suicide Precautions:  Goal: Able to verbalize acceptance of life and situations over which he or she has no control  Description  Able to verbalize acceptance of life and situations over which he or she has no control  6/11/2019 2159 by Di Josue LPN  Outcome: Ongoing  6/11/2019 1554 by Ileene Castleman  Outcome: Ongoing  Note:                                                                       Group Therapy Note    Date: 6/11/2019  Start Time: 1430  End Time:  1530  Number of Participants: 4    Type of Group: Psychoeducation    Wellness Binder Information  Module Name:  Men's Issues  Session Number:  1    Patient's Goal:  To improve knowledge of effective stress management techniques    Notes:  Pt demonstrated improved knowledge of effective stress management techniques by actively participating in group discussion.     Status After Intervention:  Unchanged    Participation Level: None    Participation Quality: Resistant      Speech:  hesitant      Thought Process/Content: Linear      Affective Functioning: Flat      Mood: anxious and depressed      Level of consciousness:  Alert and Oriented x4      Response to Learning: Able to verbalize current knowledge/experience, Able to verbalize/acknowledge new learning and Progressing to goal      Endings: None Reported    Modes of Intervention: Education      Discipline Responsible: Psychoeducational Specialist      Signature:  Ileene Castleman    6/11/2019 1517 by Jean Paul Schilling RN  Outcome: Ongoing  6/11/2019 1035 by Yudith Davison LPC  Outcome: Ongoing  Note:                                                                       Group Therapy Note    Date: 6/11/2019  Start Time: 1000  End Time:    Number of Participants: 0    Type of Group: Psychotherapy    Patient's Goal: Pt will attend group as scheduled, identify an issue pt would like to work on regarding bettering relationships with others and/or self, pt will be Kamari Be RN  Outcome: Ongoing  Goal: Control of acute pain  Description  Control of acute pain  6/11/2019 2159 by Sammie Murray LPN  Outcome: Ongoing  6/11/2019 1517 by Lisa Garcia RN  Outcome: Ongoing  Goal: Control of chronic pain  Description  Control of chronic pain  6/11/2019 2159 by Sammie Murray LPN  Outcome: Ongoing  6/11/2019 1517 by Lisa Garcia RN  Outcome: Ongoing

## 2019-06-12 NOTE — PROGRESS NOTES
Pt reports her depression and anxiety have improved as the day has progressed. Rates depression 4, anxiety 3, denies SI, HI, AVH. Pt states she ate better. Exhibits no s/s of distress. Will continue to monitor.

## 2019-06-12 NOTE — PROGRESS NOTES
WRAP UP GROUP NOTE    Patient's Goal:  Providing feedback as to their own progress in the care-plan provided. Patients have an opportunity to explore self-reflective skills and share any additional cares and concerns not yet addressed. Pt effectively participated.     Energy Level:low  Appetite:poor  Concentration:poor  Hallucinations:  Depression:improved  Anxiety:improved  How I worked today:worked hard  What helps me sleep:read  Any questions/complaints/comments:no

## 2019-06-13 PROCEDURE — 1240000000 HC EMOTIONAL WELLNESS R&B

## 2019-06-13 PROCEDURE — 6370000000 HC RX 637 (ALT 250 FOR IP): Performed by: PSYCHIATRY & NEUROLOGY

## 2019-06-13 PROCEDURE — 99239 HOSP IP/OBS DSCHRG MGMT >30: CPT | Performed by: PSYCHIATRY & NEUROLOGY

## 2019-06-13 RX ORDER — PANTOPRAZOLE SODIUM 40 MG/1
40 TABLET, DELAYED RELEASE ORAL
Qty: 30 TABLET | Refills: 1 | Status: SHIPPED | OUTPATIENT
Start: 2019-06-13

## 2019-06-13 RX ORDER — CITALOPRAM 40 MG/1
40 TABLET ORAL DAILY
Qty: 30 TABLET | Refills: 1 | Status: SHIPPED | OUTPATIENT
Start: 2019-06-14

## 2019-06-13 RX ORDER — BUPROPION HYDROCHLORIDE 100 MG/1
100 TABLET, EXTENDED RELEASE ORAL 2 TIMES DAILY
Qty: 60 TABLET | Refills: 1 | Status: SHIPPED | OUTPATIENT
Start: 2019-06-13

## 2019-06-13 RX ORDER — HYDROXYZINE HYDROCHLORIDE 25 MG/1
25 TABLET, FILM COATED ORAL EVERY 6 HOURS PRN
Qty: 120 TABLET | Refills: 0 | Status: SHIPPED | OUTPATIENT
Start: 2019-06-13 | End: 2019-07-13

## 2019-06-13 RX ORDER — DRONABINOL 2.5 MG/1
2.5 CAPSULE ORAL 2 TIMES DAILY
Qty: 14 CAPSULE | Refills: 0 | Status: SHIPPED | OUTPATIENT
Start: 2019-06-13 | End: 2019-06-14

## 2019-06-13 RX ORDER — MIRTAZAPINE 15 MG/1
15 TABLET, FILM COATED ORAL NIGHTLY
Qty: 30 TABLET | Refills: 1 | Status: SHIPPED | OUTPATIENT
Start: 2019-06-13

## 2019-06-13 RX ADMIN — BUPROPION HYDROCHLORIDE 100 MG: 100 TABLET, FILM COATED, EXTENDED RELEASE ORAL at 08:44

## 2019-06-13 RX ADMIN — TOPIRAMATE 50 MG: 25 TABLET, FILM COATED ORAL at 08:44

## 2019-06-13 RX ADMIN — LEVOTHYROXINE SODIUM 25 MCG: 50 TABLET ORAL at 06:39

## 2019-06-13 RX ADMIN — MIRTAZAPINE 15 MG: 15 TABLET, FILM COATED ORAL at 21:00

## 2019-06-13 RX ADMIN — TOPIRAMATE 50 MG: 25 TABLET, FILM COATED ORAL at 21:00

## 2019-06-13 RX ADMIN — CITALOPRAM HYDROBROMIDE 40 MG: 20 TABLET ORAL at 08:44

## 2019-06-13 RX ADMIN — DRONABINOL 2.5 MG: 2.5 CAPSULE ORAL at 08:44

## 2019-06-13 RX ADMIN — DRONABINOL 2.5 MG: 2.5 CAPSULE ORAL at 21:00

## 2019-06-13 RX ADMIN — BUPROPION HYDROCHLORIDE 100 MG: 100 TABLET, FILM COATED, EXTENDED RELEASE ORAL at 21:00

## 2019-06-13 RX ADMIN — BUSPIRONE HYDROCHLORIDE 15 MG: 15 TABLET ORAL at 21:01

## 2019-06-13 RX ADMIN — BUSPIRONE HYDROCHLORIDE 15 MG: 15 TABLET ORAL at 08:44

## 2019-06-13 NOTE — PROGRESS NOTES
SW met with treatment team to discuss pt's progress and setbacks. SW 2 was present. Pt reportedly slept fair last night, with medications, appetite is improved, attends scheduled group activities, minimally social with peers/staff, performs ADL's, compliant with medications, behavior has been quiet/withdrawn, affect flat, reports her depression/anxiety have improved, denies SI/HI/AVH, tentative discharge Friday/Monday, continue current treatment plan.

## 2019-06-13 NOTE — PROGRESS NOTES
Progress Note  Alejandro White  6/12/2019 11:14 PM  Subjective:   Admit Date:   6/10/2019      CC/ADMIT DX:       Interval History:   Reviewed overnight events and nursing notes. She has no medical concerns. I have reviewed all labs/diagnostics from the last 24hrs. ROS:   I have done a 10 point ROS and all are negative, except what is mentioned in the HPI. DIET GENERAL;  Dietary Nutrition Supplements: Standard High Calorie Oral Supplement    Medications:      dronabinol  2.5 mg Oral BID    levothyroxine  25 mcg Oral Daily    busPIRone  15 mg Oral BID    citalopram  40 mg Oral Daily    mirtazapine  15 mg Oral Nightly    buPROPion  100 mg Oral BID    vitamin D  50,000 Units Oral Weekly    topiramate  50 mg Oral BID           Objective:   Vitals: /77   Pulse 73   Temp 98.7 °F (37.1 °C)   Resp 16   Ht 5' 6\" (1.676 m)   Wt 102 lb 2 oz (46.3 kg)   SpO2 92%   BMI 16.48 kg/m²  No intake or output data in the 24 hours ending 06/12/19 2314  General appearance: alert and cooperative with exam  Lungs: clear to auscultation bilaterally  Heart: RRR  Abdomen: soft, non-tender; bowel sounds normal; no masses,  no organomegaly  Extremities: extremities normal, atraumatic, no cyanosis or edema  Neurologic:  No obvious focal neurologic deficits. Assessment and Plan: Active Problems:    Depression with suicidal ideation    Major depressive disorder, recurrent severe without psychotic features ([address]  Resolved Problems:    * No resolved hospital problems. *    Elevated BP       Plan:  1. Continue present medication(s)   2. She is medically stable. I will monitor for any changes or concerns. 3.  Follow with Psych      Discharge planning:   her home     Reviewed treatment plans with the patient and/or family.              Electronically signed by Malini Andrews MD on 6/12/2019 at 11:14 PM

## 2019-06-13 NOTE — PLAN OF CARE
Problem: Pain:  Goal: Pain level will decrease  Description  Pain level will decrease  6/13/2019 0051 by Stuart Hernández RN  Outcome: Ongoing  6/12/2019 1508 by Demetrius Lyons RN  Outcome: Ongoing  Goal: Control of acute pain  Description  Control of acute pain  6/13/2019 0051 by Stuart Hernández RN  Outcome: Ongoing  6/12/2019 1508 by Demetrius Lyons RN  Outcome: Ongoing  Goal: Control of chronic pain  Description  Control of chronic pain  6/13/2019 0051 by Stuart Hernández RN  Outcome: Ongoing  6/12/2019 1508 by Demetrius Lyons RN  Outcome: Ongoing     Problem: Depressive Behavior With or Without Suicide Precautions:  Goal: Able to verbalize acceptance of life and situations over which he or she has no control  Description  Able to verbalize acceptance of life and situations over which he or she has no control  6/13/2019 0051 by Stuart Hernández RN  Outcome: Ongoing  6/12/2019 1556 by Stacey Longo  Outcome: Ongoing  Note:                                                                       Group Therapy Note    Date: 6/12/2019  Start Time: 1400  End Time:  1530  Number of Participants: 3    Type of Group: Cognitive Skills    Wellness Binder Information  Module Name:  [address] [place] [address]  Session Number:  1    Patient's Goal:  To increase knowledge of practical facts about depression    Notes:  Pt demonstrated improved knowledge of practical facts about depression by actively participating in group discussion.     Status After Intervention:  Unchanged    Participation Level: Interactive    Participation Quality: Attentive      Speech:  normal      Thought Process/Content: Logical      Affective Functioning: Congruent      Mood: anxious and depressed      Level of consciousness:  Alert and Oriented x4      Response to Learning: Able to verbalize current knowledge/experience, Able to verbalize/acknowledge new learning and Progressing to goal      Endings: None Reported    Modes of Intervention: Education      Discipline Vinny Nugent RN  Outcome: Ongoing  Goal: Absence of self-harm  Description  Absence of self-harm  6/13/2019 0051 by Jaida Still RN  Outcome: Met This Shift  6/12/2019 1508 by Vinny Nugent RN  Outcome: Ongoing  Goal: Participates in care planning  Description  Participates in care planning  6/13/2019 0051 by Jaida Still RN  Outcome: Ongoing  6/12/2019 1508 by Vinny Nugent RN  Outcome: Ongoing     Problem: Depressive Behavior With or Without Suicide Precautions:  Goal: Able to verbalize and/or display a decrease in depressive symptoms  Description  Able to verbalize and/or display a decrease in depressive symptoms  6/13/2019 0051 by Jaida Still RN  Outcome: Ongoing  6/12/2019 1508 by Vinny Nugent RN  Outcome: Ongoing     Problem: Nutrition  Goal: Optimal nutrition therapy  Description  Nutrition Problem: Inadequate oral intake  Intervention: Food and/or Nutrient Delivery: Continue current diet, Start ONS  Nutritional Goals: Pt will consume 50% or more of meals and supplements to promote desired wt gain     6/13/2019 0051 by Jaida Still RN  Outcome: Ongoing  6/12/2019 1508 by Vinny Nugent RN  Outcome: Ongoing  6/12/2019 1506 by Marissa Tapia RD, LD  Outcome: Ongoing     Problem: Pain:  Goal: Pain level will decrease  Description  Pain level will decrease  6/13/2019 0051 by Jaida Still RN  Outcome: Ongoing  6/12/2019 1508 by Vinny Nugent RN  Outcome: Ongoing  Goal: Control of acute pain  Description  Control of acute pain  6/13/2019 0051 by Jaida Still RN  Outcome: Ongoing  6/12/2019 1508 by Vinny Nugent RN  Outcome: Ongoing  Goal: Control of chronic pain  Description  Control of chronic pain  6/13/2019 0051 by Jaida Still RN  Outcome: Ongoing  6/12/2019 1508 by Vinny Nugent RN  Outcome: Ongoing

## 2019-06-13 NOTE — PLAN OF CARE
Problem: Depressive Behavior With or Without Suicide Precautions:  Goal: Able to verbalize acceptance of life and situations over which he or she has no control  Description  Able to verbalize acceptance of life and situations over which he or she has no control  6/13/2019 1559 by Drea Sam  Outcome: Ongoing  Note:                                                                       Group Therapy Note    Date: 6/13/2019  Start Time: 1400  End Time:  1545  Number of Participants: 2    Type of Group: Cognitive Skills    Wellness Binder Information  Module Name:  Emotional Wellness  Session Number:  3    Patient's Goal:  To improve knowledge of strategies to enhance happiness    Notes:  Pt demonstrated improved knowledge of strategies to enhance happiness by actively participating in group activity.     Status After Intervention:  Improved    Participation Level: Minimal    Participation Quality: Attentive      Speech:  normal      Thought Process/Content: Logical      Affective Functioning: Congruent      Mood: anxious and depressed      Level of consciousness:  Alert and Oriented x4      Response to Learning: Able to verbalize current knowledge/experience, Able to verbalize/acknowledge new learning and Progressing to goal      Endings: None Reported    Modes of Intervention: Education      Discipline Responsible: Psychoeducational Specialist      Signature:  Drea Sam

## 2019-06-13 NOTE — DISCHARGE SUMMARY
restarted. Patient has been started on Wellbutrin  mg twice a day for depression, in addition to antidepressant home medication Celexa 40 mg once a day. Also, she has been started on Remeron 15 mg at bedtime for depression, anxiety, stimulation of appetite and insomnia. During his hospital stay patient's condition mildly improved, however she continued to report poor appetite and feeling of nausea. She has been started on dronabinol 2.5 mg twice a day for stimulation of appetite and nausea with beneficial effect. Patient attended and participated in groups. Patient was not social with medical staff or other patients in the unit. Behaviorally she was not a problem. Patient was compliant with her medications. Patient was sleeping through the night. This patient is not suicidal, homicidal or psychotic at discharge. She does not present a danger to self or others. With the above mentioned medications changes as well as psychotherapeutic interventions, the patient reported considerable improvement in her condition. On 06/14/19 it was therefore decided to discharge the patient, as it was felt that she received maximal benefit from her hospitalization.       Number of antipsychotic medication prescribed at discharge: None  IF MORE THAN ONE IS USED: NA    History of greater than 3 failed multiple monotherapy trials: NA  Monotherapy taper plan/ cross taper in progress: NA  Augmentation of Clozapine: NA    Referral to addiction treatment: NA    Prescription for Alcohol or Drug Disorder Medication: NA    Prescription for Tobacco Cessation medication: NA    If no prescriptions for Tobacco Cessation must document why: NA    Consults: Internal medicine    Significant Diagnostic Studies:     Lab Results   Component Value Date    WBC 9.1 06/03/2019    HGB 12.8 06/03/2019    HCT 39.9 06/03/2019    MCV 89.7 06/03/2019     06/03/2019     Lab Results   Component Value Date     (H) 06/03/2019    K 3.8 06/03/2019     06/03/2019    CO2 22 06/03/2019    BUN 8 06/03/2019    CREATININE 0.8 06/03/2019    GLUCOSE 102 06/03/2019    CALCIUM 8.9 06/03/2019    PROT 7.5 05/01/2012    LABALBU 4.8 05/01/2012    ALKPHOS 89 05/01/2012    AST 13 05/01/2012    ALT 8 05/01/2012    LABGLOM >60 06/03/2019       Lab Results   Component Value Date    TSHFT4 3.47 06/11/2019    TSH 3.420 06/03/2019     Lab Results   Component Value Date    TIDHWPQB11 313 06/11/2019     Lab Results   Component Value Date    VITD25 92.2 06/11/2019       Treatments: therapies: RN and SW    Alert, Oriented X 4  Appearance:  Proper Grooming and Hygiene  Speech with Regular Rate and Rhythm  Eye Contact:  Good  No Psychomotor Agitation/Retardation Noted  Attitude:  Cooperative  Mood:  \"Good\"  Affective: Congruent, appropriate to the situation, with a normal range and intensity  Thought Processes:  Coherently communicated, logical and goal oriented  Thought Content:  No Suicidal Ideation, No Homicidal Ideation, No Auditory or Visual Hallucinations, NO Overt Delusions  Insight:  Present  Judgement:  Normal  Memory is intact for both remote and recent  Intellectual Functioning:  Within the [address] of Knowledge:  Adequate  Attention and Concentration:  Adequate      Discharge Exam:  Please, see medical note    Disposition: home    Patient Instructions:   Current Discharge Medication List      START taking these medications    Details   buPROPion (WELLBUTRIN SR) 100 MG extended release tablet Take 1 tablet by mouth 2 times daily  Qty: 60 tablet, Refills: 1      mirtazapine (REMERON) 15 MG tablet Take 1 tablet by mouth nightly  Qty: 30 tablet, Refills: 1      pantoprazole (PROTONIX) 40 MG tablet Take 1 tablet by mouth 2 times daily (before meals)  Qty: 30 tablet, Refills: 1      hydrOXYzine (ATARAX) 25 MG tablet Take 1 tablet by mouth every 6 hours as needed for Itching or Anxiety  Qty: 120 tablet, Refills: 0      dronabinol (MARINOL) 2.5 MG capsule Take 1 capsule by mouth 2 times daily for 7 days. For appetite stimulation  Qty: 14 capsule, Refills: 0    Associated Diagnoses: Major depressive disorder, recurrent severe without psychotic features ([address]         CONTINUE these medications which have CHANGED    Details   citalopram (CELEXA) 40 MG tablet Take 1 tablet by mouth daily  Qty: 30 tablet, Refills: 1         CONTINUE these medications which have NOT CHANGED    Details   diclofenac (FLECTOR) 1.3 % patch Place 1 patch onto the skin 2 times daily for 3 days  Qty: 6 patch, Refills: 0      topiramate (TOPAMAX) 50 MG tablet Take 50 mg by mouth 2 times daily      levothyroxine (SYNTHROID) 50 MCG tablet Take 25 mcg by mouth Daily       gabapentin (NEURONTIN) 300 MG capsule Take 300 mg by mouth 2 times daily. Patient states that she only takes it twice daily. vitamin D (ERGOCALCIFEROL) 31406 UNITS CAPS capsule Take 50,000 Units by mouth once a week      estropipate (OGEN) 0.75 MG tablet Take 0.75 mg by mouth daily      busPIRone (BUSPAR) 10 MG tablet Take 15 mg by mouth 2 times daily          STOP taking these medications       hydrochlorothiazide (HYDRODIURIL) 25 MG tablet Comments:   Reason for Stopping:         promethazine (PHENERGAN) 25 MG tablet Comments:   Reason for Stopping:             Activity: activity as tolerated  Diet: regular diet  Wound Care: none needed    Follow-up with [facility] provider in 2 weeks.     Time worked: More than 31 minutes    Participation: good    Electronically signed by Daryl Puente MD on 6/13/2019 at 12:51 PM

## 2019-06-13 NOTE — PROGRESS NOTES
Pt is calm and cooperative. Pt is sitting in the dayarea but does not interact unless prompted. Pt affect is very flat. Pt reports she slept \"well\" and said her concentration is \"improving\" Pt rates her depression a \"2\" and anxiety a \"1\". Pt denies hallucinations, suicidal thoughts but has anxiety off and on. Pt did shower and change clothing this morning. Pt's  did bring a bag of clothing in this morning for her. All items were logged onto belonging sheet and placed in chart. No contraband noted and string tie was cut from eder with patient's permission. Will continue to monitor.

## 2019-06-13 NOTE — PROGRESS NOTES
WRAP UP GROUP NOTE:     Patient's Goal:  Providing feedback as to their own progress in the care-plan provided. Pt's have an opportunity to explore self-reflective skills and share any additional cares and concerns not yet addressed. Pt effectively participated.      Energy level:NORMAL  Appetite:IMPROVING  Concentration: IMPROVING  Hallucinations:[employer]  Anxiety:IMPROVING  How I worked today:WORKED HARD  What helps me sleep:RELAXATION  Any questions/complaints/comments:NO

## 2019-06-13 NOTE — PROGRESS NOTES
Department of Psychiatry  Attending Progress Note - Geriatric      SUBJECTIVE:    The patient is a 66 y.o. female with previous psychiatric history of depression and anxiety who has been admitted to psychiatric unit from [facility] due to suicidal attempt by overdose of prescribed Vistaril. Patient has been seen in my office. She reported that she is doing significantly better and her mood is \"good\" today. She endorses significantly improved appetite, stated that she ate all food yesterday for dinner and today's breakfast, feels hungry and wants to go and eat lunch. Patient stated \"I never thought that I would dream about baked potato and gravy\". Patient endorses good quality of sleep, stated that she slept 8-1/2 hours last night, did not experience any difficulties to fall asleep or stay asleep. Patient is compliant with currently prescribed medications, denies any side effects. She endorses beneficial effect of prescribed medication for her mood, sleep, and appetite. Patient reported significantly improved level of depression and anxiety, she graded both today as 2 out of 10, with 10 being the worst.  She attends and participates in group activities in the unit. She performs her ADLs. Patient is not social with medical staff or other patients of the unit. Most of the time she stays in activity room and watch TV between group activities. Currently, patient denies any active suicidal or homicidal ideations, denies any plans. She also denies any auditory visual hallucinations. Discussed with patient treatment plan and discharge plan. Informed patient that at this point no additional medication changes has been planned. Patient can be discharged home tomorrow. Patient agreed with this plan.     OBJECTIVE    Physical  VITALS:  /84   Pulse 79   Temp 97.2 °F (36.2 °C) (Temporal)   Resp 16   Ht 5' 6\" (1.676 m)   Wt 102 lb 2 oz (46.3 kg)   SpO2 95%   BMI 16.48 kg/m²   TEMPERATURE: Current - Temp: 97.2 °F (36.2 °C); Max - Temp  Av °F (36.7 °C)  Min: 97.2 °F (36.2 °C)  Max: 98.7 °F (37.1 °C)  RESPIRATIONS RANGE: Resp  Av  Min: 16  Max: 16  PULSE RANGE: Pulse  Av  Min: 73  Max: 79  BLOOD PRESSURE RANGE:  Systolic (69TKZ), AQZ:995 , Min:115 , MHW:663   ; Diastolic (53QRM), EKO:97, Min:77, Max:84    Review of Systems: 14 point review of systems is negative    Psychological ROS: positive for mild depression and anxiety    Mental Status Examination:    Appearance: Appropriately groomed, wearing casual civilian cothes. Made good eye contact. Behavior: Calm, cooperative. No psychomotor retardation/agitation appreciated. Gait unremarkable. Speech: Normal in tone, volume, and quality. No slurring, dysarthria or   pressured speech noted. Mood: \"much better\"   Affect: Mood congruent. Range is full. Thought Process: Appears linear and goal oriented. Thought Content: Patient does not have any current active suicidal and   homicidal ideations. No overt delusions or paranoia appreciated. Perceptions: Seems patient does not have any auditory or visual hallucinations at present time. Patient did not appear to be responding to internal stimuli. No overt psychosis. Orientation: to person, place, date, and situation. Alert. Language: Intact. Fund of information: Intact. Memory: recent and remote appear intact. Impulsivity: Improved. Neurovegitative: Improved appetite, good sleep. Insight: Present  Judgment: Fair.     Data  No new lab test results to review    Medications  Current Facility-Administered Medications: dronabinol (MARINOL) capsule 2.5 mg, 2.5 mg, Oral, BID  levothyroxine (SYNTHROID) tablet 25 mcg, 25 mcg, Oral, Daily  busPIRone (BUSPAR) tablet 15 mg, 15 mg, Oral, BID  citalopram (CELEXA) tablet 40 mg, 40 mg, Oral, Daily  mirtazapine (REMERON) tablet 15 mg, 15 mg, Oral, Nightly  buPROPion (WELLBUTRIN SR) extended release tablet 100 mg, 100 mg, Oral, BID  vitamin D (ERGOCALCIFEROL) capsule 50,000 Units, 50,000 Units, Oral, Weekly  acetaminophen (TYLENOL) tablet 650 mg, 650 mg, Oral, Q4H PRN  magnesium hydroxide (MILK OF MAGNESIA) 400 MG/5ML suspension 30 mL, 30 mL, Oral, Daily PRN  topiramate (TOPAMAX) tablet 50 mg, 50 mg, Oral, BID    ASSESSMENT AND PLAN    DSM 5 DIAGNOSIS:  Major depressive disorder, recurrent, severe, without psychotic features  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 Psychiatric Medications:   Continue current psychotropic medications as recommended.   Patient denies any side effect of currently prescribed medications. No change with dose of currently prescribed medications today.     2. Medical Issues:    Continue medical monitoring by Dr. Tim Bazan and associates.       3.  Disposition:    Discharge patient home tomorrow on 06/14/19     Amount of time spent with patient:   35 minutes with greater than 50% of the time spent in counseling and collaboration of care

## 2019-06-14 VITALS
TEMPERATURE: 98.1 F | HEIGHT: 66 IN | DIASTOLIC BLOOD PRESSURE: 92 MMHG | HEART RATE: 88 BPM | SYSTOLIC BLOOD PRESSURE: 122 MMHG | OXYGEN SATURATION: 94 % | RESPIRATION RATE: 16 BRPM | WEIGHT: 102.13 LBS | BODY MASS INDEX: 16.42 KG/M2

## 2019-06-14 PROCEDURE — 6370000000 HC RX 637 (ALT 250 FOR IP): Performed by: PSYCHIATRY & NEUROLOGY

## 2019-06-14 PROCEDURE — 5130000000 HC BRIDGE APPOINTMENT

## 2019-06-14 RX ORDER — DRONABINOL 2.5 MG/1
2.5 CAPSULE ORAL 2 TIMES DAILY
Qty: 14 CAPSULE | Refills: 0 | Status: SHIPPED | OUTPATIENT
Start: 2019-06-14 | End: 2019-06-21

## 2019-06-14 RX ADMIN — LEVOTHYROXINE SODIUM 25 MCG: 50 TABLET ORAL at 06:29

## 2019-06-14 RX ADMIN — BUPROPION HYDROCHLORIDE 100 MG: 100 TABLET, FILM COATED, EXTENDED RELEASE ORAL at 08:47

## 2019-06-14 RX ADMIN — CITALOPRAM HYDROBROMIDE 40 MG: 20 TABLET ORAL at 08:47

## 2019-06-14 RX ADMIN — DRONABINOL 2.5 MG: 2.5 CAPSULE ORAL at 08:47

## 2019-06-14 RX ADMIN — BUSPIRONE HYDROCHLORIDE 15 MG: 15 TABLET ORAL at 08:47

## 2019-06-14 NOTE — PROGRESS NOTES
Discharge Note    Pt discharging on this date. Pt denies SI, HI, and AVH at this time. Pt reports improvement in behavior and is leaving unit in overall good condition. SW and pt discussed pt's follow up appointments and importance of attending appointments as scheduled, pt voiced understanding and agreement. Pt able to verbally identify: warning signs, coping strategies, places and people that help make the pt feel better/distract negative thoughts, friends/family/agencies/professionals the pt can reach out to in a crisis, and something that is important to the pt/worth living for. Pt provided the national suicide prevention hotline number (8-916-761-790-457-5119) as well as local community behavioral health [facility]) crisis number for emergencies (5-989.844.1067). Pt to follow up with [address] [place] [address] in [place] for a therapy appointment on 6/19/19 at 10 AM with Jose Goldberg and a med management appointment on 6/25/19 at 8 AM with Dr. Omi Canseco. SW offered to assist pt with transportation, pt reports her  will be picking her up. Referral to out patient tobacco cessation counseling treatment: Patient refused tobacco cessation counseling. SW spoke with pt's  Molly Palumbo [phone] about weapons in home. Pt's  reported no weapons in home. Pt denies use of substances. Referral declined.      Electronically signed by Leslie Bazan [facility] on 6/14/2019 at 8:35 AM

## 2019-06-14 NOTE — PROGRESS NOTES
Group Therapy Note    Date: 6/13/2019  Start Time: 1930  End Time:  2000  Number of Participants: 2    Type of Group: Wrap-Up    Wellness Binder Information  Module Name:  Wrap-up & music  Session Number:      Patient's Goal:  Voice their feelings of their day    Notes: pt completed wrap-up    Status After Intervention:  Unchanged    Participation Level: Minimal    Participation Quality: Appropriate      Speech:  normal      Thought Process/Content: Logical  Linear      Affective Functioning: Flat      Mood: depressed      Level of consciousness:  Alert and Oriented x4      Response to Learning: Able to verbalize current knowledge/experience and Progressing to goal      Endings: None Reported    Modes of Intervention: Support      Discipline Responsible: Registered Nurse      Signature:  Sharan Sims RN

## 2019-06-14 NOTE — PROGRESS NOTES
[facility] Adult Unit Daily Assessment  Nursing Progress Note    Room: 0620/620-01   Name: Kathy Oneill   Age: 66 y.o. Gender: female   Dx: <principal problem not specified>  Precautions: suicide risk and fall risk  Inpatient Status: voluntary       Sleep: Yes,   Sleep Quality Good   Hours Slept: asleep since 2145   Sleep Medications: Yes, Remeron 15mg  PRN Sleep Meds: No       Other PRN Meds: No   Med Compliant: Yes   Accu-Chek: No   Oxygen: No  CIWA/CINA: No          SI denies suicidal ideation    HI Negative for homicidal ideation        AVH:Absent      Depression: 0   Anxiety: 0       Appetite: improved    Social: No   Speech: normal   Appearance: appropriately dressed and healthy looking    Group Participation: Yes  Participation LevelActive Listener    Participation QualityAppropriate    Notes: Pt in TV area, watching TV most of the evning. Pt pleasant with staff. States 'I'm going home tomorrow.' No c/o's voiced. Will continue to monitor via 15 minute rounds.     Electronically signed by Michelle Henriquez RN on 6/13/2019 at 11:31 PM

## 2019-06-14 NOTE — PROGRESS NOTES
Progress Note  Kasey Carnes  6/14/2019 12:12 AM  Subjective:   Admit Date:   6/10/2019      CC/ADMIT DX:       Interval History:   Reviewed overnight events and nursing notes. She denies any new medical complaints. I have reviewed all labs/diagnostics from the last 24hrs. ROS:   I have done a 10 point ROS and all are negative, except what is mentioned in the HPI. DIET GENERAL;  Dietary Nutrition Supplements: Standard High Calorie Oral Supplement    Medications:      dronabinol  2.5 mg Oral BID    levothyroxine  25 mcg Oral Daily    busPIRone  15 mg Oral BID    citalopram  40 mg Oral Daily    mirtazapine  15 mg Oral Nightly    buPROPion  100 mg Oral BID    vitamin D  50,000 Units Oral Weekly    topiramate  50 mg Oral BID           Objective:   Vitals: /78   Pulse 71   Temp 99.6 °F (37.6 °C)   Resp 16   Ht 5' 6\" (1.676 m)   Wt 102 lb 2 oz (46.3 kg)   SpO2 94%   BMI 16.48 kg/m²  No intake or output data in the 24 hours ending 06/14/19 0012  General appearance: alert and cooperative with exam  Lungs: clear to auscultation bilaterally  Heart: RRR  Abdomen: soft, non-tender; bowel sounds normal; no masses,  no organomegaly  Extremities: extremities normal, atraumatic, no cyanosis or edema  Neurologic:  No obvious focal neurologic deficits. Assessment and Plan: Active Problems:    Depression with suicidal ideation    Major depressive disorder, recurrent severe without psychotic features ([address]  Resolved Problems:    * No resolved hospital problems. *    Elevated BP       Plan:  1. Continue present medication(s)   2. She remains medically stable. I will monitor for any changes or new concerns. 3.  Follow with Psych      Discharge planning:   her home     Reviewed treatment plans with the patient and/or family.              Electronically signed by Abner Emanuel MD on 6/14/2019 at 12:12 AM

## 2019-06-14 NOTE — PROGRESS NOTES
[facility]  Discharge Note  Bridge Appointment completed: Reviewed Discharge Instructions with patient. Patient verbalizes understanding and agreement with the discharge plan using the teachback method. Referral for Outpatient Tobacco Cessation Counseling, upon discharge (stephanie X if applicable and completed):    ( )  Hospital staff assisted patient to call Quit Line or faxed referral                                   during hospitalization                  ( )  Recognizing danger situations (included triggers and roadblocks), if not completed on admission                    ( )  Coping skills (new ways to manage stress, exercise, relaxation techniques, changing routine, distraction), if not completed on admission                                                           ( )  Basic information about quitting (benefits of quitting, techniques in how to quit, available resources, if not completed on admission  ( ) Referral for counseling faxed to [facility]   ( ) Patient refused referral  (X ) Patient refused counseling  (X ) Patient refused smoking cessation medication upon discharge    Vaccinations (stephanie X if applicable and completed):  ( ) Patient states already received influenza vaccine elsewhere  ( ) Patient received influenza vaccine during this hospitalization  (X ) Patient refused influenza vaccine at this time      Pt discharged with followings belongings:   Dentures: Uppers  Vision - Corrective Lenses: None  Hearing Aid: None  Jewelry: None  Body Piercings Removed: N/A  Clothing: Shirt, Pajamas, Undergarments (Comment), Footwear  Were All Patient Medications Collected?: Not Applicable  Other Valuables: None   Valuables sent home with NONE HERE Valuables retrieved from safe and returned to patient. NONE HERE.   Patient left department with STAFF AND  via AMBULATORY, discharged to HOME Patient education on aftercare instructions: YESPatient verbalize understanding of AVS:  YES  SI? no plan AVH? NO HI?  Negative for homicidal ideation       Status EXAM upon discharge:  Status and Exam  Normal: No  Facial Expression: Flat  Affect: Congruent  Level of Consciousness: Alert  Mood:Normal: No  Mood: Anxious, Depressed(REPORTS SHE IS DOING MUCH BETTER)  Motor Activity:Normal: Yes  Motor Activity: Increased  Interview Behavior: Cooperative  Preception: [place] to Person, Eulice Sharan to Time, [place] to Place, [place] to Situation  Attention:Normal: Yes  Attention: (ADEQUATE)  Thought Processes: (LINEAR ANND GOAL DIRECTED)  Thought Content:Normal: Yes  Thought Content: (DENIES ANY ACTIVSUICIDAL THOUGHTS)  Hallucinations: None  Delusions: No  Memory:Normal: Yes  Memory: (INTACT FOR RECENT AND REMOTE)  Insight and Judgment: Yes  Insight and Judgment: (FAIR INSIGHT AND JUDGMENT)  Present Suicidal Ideation: No  Present Homicidal Ideation: No

## 2019-06-14 NOTE — PROGRESS NOTES
[facility] Daily Shift Assessment  Nursing Progress Note    Room: 0620/620-01 Name: Vinay Valverde Age: 66 y.o. Gender: female   Dx: <principal problem not specified>  Precautions: suicide risk  Target Symptoms:   Accu-Chek: NoSleep: Yes,Sleep Quality Good SI no plan AVH no [facility] [place]  ADLs: No Speech: normal Depression: improved Anxiety: improved   Participation LevelMinimal  Visitation: Yes   Participation QualityResistant    Complaints:    Notes: discharge is noted from 6/13. All discharge instructions are given and understood. Al follow up appointments are given for [facility]. Scripts are sent to her pharmacy. Marinol script is with patient to have filled. She denies any active suicidal thoughts. She left with her . There are no valuables or home medications here.      Signature: Navya Dawson RN

## 2019-06-14 NOTE — PLAN OF CARE
Problem: Depressive Behavior With or Without Suicide Precautions:  Goal: Able to verbalize acceptance of life and situations over which he or she has no control  Description  Able to verbalize acceptance of life and situations over which he or she has no control  Outcome: Ongoing  Goal: Able to verbalize and/or display a decrease in depressive symptoms  Description  Able to verbalize and/or display a decrease in depressive symptoms  Outcome: Ongoing  Goal: Ability to disclose and discuss suicidal ideas will improve  Description  Ability to disclose and discuss suicidal ideas will improve  Outcome: Ongoing  Goal: Able to verbalize support systems  Description  Able to verbalize support systems  Outcome: Ongoing  Goal: Absence of self-harm  Description  Absence of self-harm  Outcome: Ongoing  Goal: Participates in care planning  Description  Participates in care planning  Outcome: Ongoing     Problem: Pain:  Goal: Pain level will decrease  Description  Pain level will decrease  Outcome: Ongoing  Goal: Control of acute pain  Description  Control of acute pain  Outcome: Ongoing  Goal: Control of chronic pain  Description  Control of chronic pain  Outcome: Ongoing     Problem: Nutrition  Goal: Optimal nutrition therapy  Description  Nutrition Problem: Inadequate oral intake  Intervention: Food and/or Nutrient Delivery: Continue current diet, Start ONS  Nutritional Goals: Pt will consume 50% or more of meals and supplements to promote desired wt gain     Outcome: Ongoing

## 2019-06-14 NOTE — PROGRESS NOTES
SW met with treatment team to discuss pt's progress and setbacks. SW 2 was present. Pt reportedly slept well last night, with medications, appetite has improved, attends scheduled group activities, some social with staff, performs ADL's, compliant with medications, behavior has been pleasant/cooperative, denies depression/anxiety, denies SI/HI/AVH, will be discharged today, follow-up appointments will be scheduled.

## 2020-03-11 ENCOUNTER — TRANSCRIBE ORDERS (OUTPATIENT)
Dept: ADMINISTRATIVE | Facility: HOSPITAL | Age: 60
End: 2020-03-11

## 2020-03-11 DIAGNOSIS — N63.20 LEFT BREAST LUMP: Primary | ICD-10-CM

## 2020-03-19 ENCOUNTER — HOSPITAL ENCOUNTER (OUTPATIENT)
Dept: ULTRASOUND IMAGING | Facility: HOSPITAL | Age: 60
Discharge: HOME | End: 2020-03-19

## 2020-03-19 ENCOUNTER — HOSPITAL ENCOUNTER (OUTPATIENT)
Dept: MAMMOGRAPHY | Facility: HOSPITAL | Age: 60
Discharge: HOME | End: 2020-03-19
Admitting: NURSE PRACTITIONER

## 2020-03-19 DIAGNOSIS — N63.20 LEFT BREAST LUMP: ICD-10-CM

## 2020-03-19 PROCEDURE — 77065 DX MAMMO INCL CAD UNI: CPT

## 2020-03-19 PROCEDURE — G0279 TOMOSYNTHESIS, MAMMO: HCPCS

## 2020-03-19 PROCEDURE — 76642 ULTRASOUND BREAST LIMITED: CPT

## 2020-10-12 ENCOUNTER — TRANSCRIBE ORDERS (OUTPATIENT)
Dept: ADMINISTRATIVE | Facility: HOSPITAL | Age: 60
End: 2020-10-12

## 2020-10-12 DIAGNOSIS — N63.0 LUMP OF BREAST: Primary | ICD-10-CM

## 2020-10-12 DIAGNOSIS — R92.8 ABNORMAL MAMMOGRAM: ICD-10-CM

## 2020-10-15 ENCOUNTER — HOSPITAL ENCOUNTER (OUTPATIENT)
Dept: MAMMOGRAPHY | Facility: HOSPITAL | Age: 60
Discharge: HOME | End: 2020-10-15

## 2020-10-15 ENCOUNTER — HOSPITAL ENCOUNTER (OUTPATIENT)
Dept: ULTRASOUND IMAGING | Facility: HOSPITAL | Age: 60
Discharge: HOME | End: 2020-10-15

## 2020-10-15 DIAGNOSIS — R92.8 ABNORMAL MAMMOGRAM: ICD-10-CM

## 2020-10-15 PROCEDURE — 77066 DX MAMMO INCL CAD BI: CPT

## 2020-10-15 PROCEDURE — 76641 ULTRASOUND BREAST COMPLETE: CPT

## 2020-10-15 PROCEDURE — G0279 TOMOSYNTHESIS, MAMMO: HCPCS

## 2021-10-13 ENCOUNTER — TRANSCRIBE ORDERS (OUTPATIENT)
Dept: ADMINISTRATIVE | Facility: HOSPITAL | Age: 61
End: 2021-10-13

## 2021-10-13 DIAGNOSIS — Z12.31 SCREENING MAMMOGRAM, ENCOUNTER FOR: Primary | ICD-10-CM

## 2021-10-22 ENCOUNTER — HOSPITAL ENCOUNTER (OUTPATIENT)
Dept: MAMMOGRAPHY | Facility: HOSPITAL | Age: 61
Discharge: HOME | End: 2021-10-22
Admitting: NURSE PRACTITIONER

## 2021-10-22 PROCEDURE — 77063 BREAST TOMOSYNTHESIS BI: CPT

## 2021-10-22 PROCEDURE — 77067 SCR MAMMO BI INCL CAD: CPT

## 2023-04-17 NOTE — PROGRESS NOTES
Subjective    Ms. Napier is 62 y.o. female    Chief Complaint: recurrent UTI    History of Present Illness    62-year-old female new patient referred for recurrent urinary tract infection.  Onset 3 months ago.  For which patient states that she has been on at least 2 different antibiotics 1 in January and 1 in March of this year.  Urine cultures confirmed January ESBL E. coli and Ureaplasma and March Proteus and Ureaplasma patient was treated with nitrofurantoin and Bactrim DS.  When symptoms are present consist of suprapubic discomfort and bladder spasms for which patient states that she ultimately feels whole body spasms.  Denies any prior history of urinary tract infections.  Is currently asymptomatic.  Reports finished antibiotic approximately 1 month ago.    Patient has recently started on oxybutynin for history of nocturnal enuresis.  Reports leakage with position change as well.    The following portions of the patient's history were reviewed and updated as appropriate: allergies, current medications, past family history, past medical history, past social history, past surgical history and problem list.    Review of Systems   Constitutional: Negative for chills and fever.   Gastrointestinal: Negative for abdominal pain, anal bleeding, blood in stool, nausea and vomiting.   Genitourinary: Positive for frequency, pelvic pain and urgency. Negative for decreased urine volume, difficulty urinating, dysuria, flank pain, hematuria and vaginal pain.         Current Outpatient Medications:   •  ARIPiprazole (ABILIFY) 2 MG tablet, , Disp: , Rfl:   •  busPIRone (BUSPAR) 10 MG tablet, Take 1.5 tablets by mouth., Disp: , Rfl:   •  ergocalciferol (ERGOCALCIFEROL) 1.25 MG (59821 UT) capsule, Take 1 capsule by mouth., Disp: , Rfl:   •  escitalopram (LEXAPRO) 5 MG tablet, , Disp: , Rfl:   •  [START ON 4/27/2023] estradiol (ESTRACE) 0.1 MG/GM vaginal cream, Insert 2 g into the vagina 2 (Two) Times a Week., Disp: 42.5 g, Rfl:  "12  •  levothyroxine (SYNTHROID, LEVOTHROID) 25 MCG tablet, , Disp: , Rfl:   •  oxybutynin XL (DITROPAN-XL) 5 MG 24 hr tablet, , Disp: , Rfl:   •  pantoprazole (PROTONIX) 40 MG EC tablet, , Disp: , Rfl:   •  prazosin (MINIPRESS) 2 MG capsule, , Disp: , Rfl:   •  QUEtiapine (SEROquel) 50 MG tablet, Take 2 tablets by mouth Daily., Disp: , Rfl:   •  topiramate (TOPAMAX) 50 MG tablet, Take 1 tablet by mouth., Disp: , Rfl:     History reviewed. No pertinent past medical history.    Past Surgical History:   Procedure Laterality Date   • BREAST BIOPSY Bilateral     benign   • HYSTERECTOMY     • OOPHORECTOMY         Social History     Socioeconomic History   • Marital status:    Tobacco Use   • Smoking status: Never     Passive exposure: Never   • Smokeless tobacco: Never   Substance and Sexual Activity   • Alcohol use: Not Currently   • Drug use: Never   • Sexual activity: Defer       Family History   Problem Relation Age of Onset   • Breast cancer Maternal Grandmother        Objective    Temp 97.6 °F (36.4 °C)   Ht 167.6 cm (66\")   Wt 56.2 kg (123 lb 12.8 oz)   BMI 19.98 kg/m²     Physical Exam  Nursing note reviewed.   Constitutional:       General: She is not in acute distress.     Appearance: Normal appearance. She is not ill-appearing.   HENT:      Nose: No congestion.   Abdominal:      Tenderness: There is no right CVA tenderness or left CVA tenderness.      Hernia: No hernia is present.   Skin:     General: Skin is warm and dry.   Neurological:      Mental Status: She is alert and oriented to person, place, and time.   Psychiatric:         Mood and Affect: Mood normal.         Behavior: Behavior normal.             Results for orders placed or performed in visit on 04/25/23   POC Urinalysis Dipstick, Multipro    Specimen: Urine   Result Value Ref Range    Color Yellow Yellow, Straw, Dark Yellow, [place]    Clarity, UA Clear Clear    Glucose, UA Negative Negative mg/dL    Bilirubin Negative Negative    " Ketones, UA Negative Negative    Specific Gravity  1.015 1.005 - 1.030    Blood, UA Negative Negative    pH, Urine 7.0 5.0 - 8.0    Protein, POC Negative Negative mg/dL    Urobilinogen, UA 0.2 E.U./dL Normal, 0.2 E.U./dL    Nitrite, UA Negative Negative    Leukocytes Small (1+) (A) Negative     Assessment and Plan    Diagnoses and all orders for this visit:    1. Recurrent UTI (Primary)  -     POC Urinalysis Dipstick, Multipro  -     Discontinue: estradiol (ESTRACE) 0.1 MG/GM vaginal cream; Insert 2 g into the vagina Daily.  Dispense: 42.5 g; Refill: 12  -     estradiol (ESTRACE) 0.1 MG/GM vaginal cream; Insert 2 g into the vagina 2 (Two) Times a Week.  Dispense: 42.5 g; Refill: 12    2. Mixed incontinence      62-year-old female new patient referred for recurrent urinary tract infection    Urinalysis today showing small leukocytes    Patient completely asymptomatic    Recently finished Bactrim within the last month due to culture positive Proteus bacteria    Recommend starting patient on a vaginal estrogen cream 2 times weekly    Encouraged over-the-counter cranberry, probiotic, and d-mannose daily    Encouraged increased fluid intake    We will start patient on estradiol vaginal cream 2 times weekly.    Continue oxybutynin for nocturnal enuresis    Follow up in 3 months

## 2023-04-25 ENCOUNTER — OFFICE VISIT (OUTPATIENT)
Dept: UROLOGY | Facility: CLINIC | Age: 63
End: 2023-04-25
Payer: MEDICARE

## 2023-04-25 ENCOUNTER — PATIENT ROUNDING (BHMG ONLY) (OUTPATIENT)
Dept: UROLOGY | Facility: CLINIC | Age: 63
End: 2023-04-25
Payer: MEDICARE

## 2023-04-25 VITALS — BODY MASS INDEX: 19.89 KG/M2 | WEIGHT: 123.8 LBS | HEIGHT: 66 IN | TEMPERATURE: 97.6 F

## 2023-04-25 DIAGNOSIS — N39.46 MIXED INCONTINENCE: ICD-10-CM

## 2023-04-25 DIAGNOSIS — N39.0 RECURRENT UTI: Primary | ICD-10-CM

## 2023-04-25 LAB
BILIRUB BLD-MCNC: NEGATIVE MG/DL
CLARITY, POC: CLEAR
COLOR UR: YELLOW
GLUCOSE UR STRIP-MCNC: NEGATIVE MG/DL
KETONES UR QL: NEGATIVE
LEUKOCYTE EST, POC: ABNORMAL
NITRITE UR-MCNC: NEGATIVE MG/ML
PH UR: 7 [PH] (ref 5–8)
PROT UR STRIP-MCNC: NEGATIVE MG/DL
RBC # UR STRIP: NEGATIVE /UL
SP GR UR: 1.01 (ref 1–1.03)
UROBILINOGEN UR QL: ABNORMAL

## 2023-04-25 PROCEDURE — 1160F RVW MEDS BY RX/DR IN RCRD: CPT

## 2023-04-25 PROCEDURE — 81001 URINALYSIS AUTO W/SCOPE: CPT

## 2023-04-25 PROCEDURE — 1159F MED LIST DOCD IN RCRD: CPT

## 2023-04-25 PROCEDURE — 99204 OFFICE O/P NEW MOD 45 MIN: CPT

## 2023-04-25 RX ORDER — ESTRADIOL 0.1 MG/G
2 CREAM VAGINAL DAILY
Qty: 42.5 G | Refills: 12 | Status: SHIPPED | OUTPATIENT
Start: 2023-04-25 | End: 2023-04-25

## 2023-04-25 RX ORDER — ESTRADIOL 0.1 MG/G
2 CREAM VAGINAL 2 TIMES WEEKLY
Qty: 42.5 G | Refills: 12 | Status: SHIPPED | OUTPATIENT
Start: 2023-04-27

## 2023-04-25 RX ORDER — PANTOPRAZOLE SODIUM 40 MG/1
TABLET, DELAYED RELEASE ORAL
COMMUNITY
Start: 2023-04-12

## 2023-04-25 RX ORDER — BUSPIRONE HYDROCHLORIDE 10 MG/1
15 TABLET ORAL
COMMUNITY

## 2023-04-25 RX ORDER — ARIPIPRAZOLE 2 MG/1
TABLET ORAL
COMMUNITY
Start: 2023-04-19

## 2023-04-25 RX ORDER — ERGOCALCIFEROL 1.25 MG/1
50000 CAPSULE ORAL
COMMUNITY

## 2023-04-25 RX ORDER — TOPIRAMATE 50 MG/1
50 TABLET, FILM COATED ORAL
COMMUNITY

## 2023-04-25 RX ORDER — LEVOTHYROXINE SODIUM 0.03 MG/1
TABLET ORAL
COMMUNITY
Start: 2023-04-11

## 2023-04-25 RX ORDER — QUETIAPINE FUMARATE 50 MG/1
100 TABLET, FILM COATED ORAL DAILY
COMMUNITY
Start: 2023-04-11

## 2023-04-25 RX ORDER — OXYBUTYNIN CHLORIDE 5 MG/1
TABLET, EXTENDED RELEASE ORAL
COMMUNITY
Start: 2023-04-05

## 2023-04-25 RX ORDER — PRAZOSIN HYDROCHLORIDE 2 MG/1
CAPSULE ORAL
COMMUNITY
Start: 2023-04-11

## 2023-04-25 RX ORDER — ESCITALOPRAM OXALATE 5 MG/1
TABLET ORAL
COMMUNITY
Start: 2023-04-03

## 2023-04-25 NOTE — PROGRESS NOTES
April 25, 2023    Hello, may I speak with Carmencita Napier?    My name is Brenda    I am  with [facility] [place]  [address] [place] [address], [address]  [facility] [id number] [phone].    Before we get started may I verify your date of birth? 1960    I am calling to officially welcome you to our practice and ask about your recent visit. Is this a good time to talk? Yes    Tell me about your visit with us. What things went well?  My visit went very well.       We're always looking for ways to make our patients' experiences even better. Do you have recommendations on ways we may improve?  No everything was great.    Overall were you satisfied with your first visit to our practice? Yes       I appreciate you taking the time to speak with me today. Is there anything else I can do for you? No      Thank you, and have a great day.

## 2023-07-21 NOTE — PROGRESS NOTES
Subjective    Ms. Napier is 62 y.o. female    Chief Complaint: follow up mixed incontinence & recurrent UTI    History of Present Illness    62-year-old female established patient in for follow-up after recently increasing the oxybutynin to 10 mg daily for urge incontinence and nocturnal enuresis.  Patient reports the urge incontinence is much improved as well as the urine frequency and patient is no longer experiencing any nocturnal enuresis since starting the oxybutynin however patient is continuing to report worsening urinary leakage with position change in the absence of urge.    0 reported infections since last visit.  Has remained on twice daily vaginal estrogen cream.    The following portions of the patient's history were reviewed and updated as appropriate: allergies, current medications, past family history, past medical history, past social history, past surgical history and problem list.    Review of Systems   Constitutional:  Negative for chills, fatigue and fever.   Gastrointestinal:  Negative for abdominal pain, anal bleeding, blood in stool, nausea and vomiting.   Genitourinary:  Positive for urgency. Negative for decreased urine volume, difficulty urinating, dysuria, flank pain, frequency, hematuria, pelvic pain and vaginal pain.       Current Outpatient Medications:     ARIPiprazole (ABILIFY) 2 MG tablet, , Disp: , Rfl:     busPIRone (BUSPAR) 10 MG tablet, Take 1.5 tablets by mouth., Disp: , Rfl:     ergocalciferol (ERGOCALCIFEROL) 1.25 MG (38923 UT) capsule, Take 1 capsule by mouth., Disp: , Rfl:     escitalopram (LEXAPRO) 5 MG tablet, , Disp: , Rfl:     estradiol (ESTRACE) 0.1 MG/GM vaginal cream, Insert 2 g into the vagina 2 (Two) Times a Week., Disp: 42.5 g, Rfl: 12    levothyroxine (SYNTHROID, LEVOTHROID) 25 MCG tablet, , Disp: , Rfl:     pantoprazole (PROTONIX) 40 MG EC tablet, , Disp: , Rfl:     prazosin (MINIPRESS) 2 MG capsule, , Disp: , Rfl:     QUEtiapine (SEROquel) 50 MG tablet, Take 2  "tablets by mouth Daily., Disp: , Rfl:     topiramate (TOPAMAX) 50 MG tablet, Take 1 tablet by mouth., Disp: , Rfl:     oxybutynin XL (DITROPAN-XL) 10 MG 24 hr tablet, Take 1 tablet by mouth Daily., Disp: 90 tablet, Rfl: 3    History reviewed. No pertinent past medical history.    Past Surgical History:   Procedure Laterality Date    BREAST BIOPSY Bilateral     benign    HYSTERECTOMY      OOPHORECTOMY         Social History     Socioeconomic History    Marital status:    Tobacco Use    Smoking status: Never     Passive exposure: Never    Smokeless tobacco: Never   Substance and Sexual Activity    Alcohol use: Not Currently    Drug use: Never    Sexual activity: Defer       Family History   Problem Relation Age of Onset    Breast cancer Maternal Grandmother        Objective    Temp 97.7 °F (36.5 °C)   Ht 167.6 cm (65.98\")   Wt 60.3 kg (133 lb)   BMI 21.48 kg/m²     Physical Exam  Nursing note reviewed.   Constitutional:       General: She is not in acute distress.     Appearance: Normal appearance. She is not ill-appearing.   HENT:      Nose: No congestion.   Abdominal:      Tenderness: There is no right CVA tenderness or left CVA tenderness.      Hernia: No hernia is present.   Skin:     General: Skin is warm and dry.   Neurological:      Mental Status: She is alert and oriented to person, place, and time.   Psychiatric:         Mood and Affect: Mood normal.         Behavior: Behavior normal.           Results for orders placed or performed in visit on 07/26/23   POC Urinalysis Dipstick, Multipro    Specimen: Urine   Result Value Ref Range    Color Yellow Yellow, Straw, Dark Yellow, Zahira    Clarity, UA Clear Clear    Glucose, UA Negative Negative mg/dL    Bilirubin Negative Negative    Ketones, UA Negative Negative    Specific Gravity  1.010 1.005 - 1.030    Blood, UA Trace (A) Negative    pH, Urine 6.5 5.0 - 8.0    Protein, POC Negative Negative mg/dL    Urobilinogen, UA 0.2 E.U./dL Normal, 0.2 E.U./dL    " Nitrite, UA Negative Negative    Leukocytes Negative Negative     Assessment and Plan    Diagnoses and all orders for this visit:    1. Recurrent UTI (Primary)  -     POC Urinalysis Dipstick, Multipro    2. Mixed incontinence  -     POC Urinalysis Dipstick, Multipro  -     Discontinue: solifenacin (VESICARE) 10 MG tablet; Take 1 tablet by mouth Daily.  Dispense: 30 tablet; Refill: 11  -     oxybutynin XL (DITROPAN-XL) 10 MG 24 hr tablet; Take 1 tablet by mouth Daily.  Dispense: 90 tablet; Refill: 3      Doing well in regards to urine urgency and nocturnal enuresis since increasing the oxybutynin to 10 mg daily    Still reporting worsening urinary leakage with position change in the absence of urge    Provided patient with a brochure regarding the Bulkamid injection procedure patient is very interested and would like a consultation-has requested Dr. Lowe    Patient denies any external bulge has never been told of any prolapse    We will continue oxybutynin 10 mg daily

## 2023-07-26 ENCOUNTER — OFFICE VISIT (OUTPATIENT)
Dept: UROLOGY | Facility: CLINIC | Age: 63
End: 2023-07-26
Payer: MEDICARE

## 2023-07-26 VITALS — BODY MASS INDEX: 21.38 KG/M2 | WEIGHT: 133 LBS | HEIGHT: 66 IN | TEMPERATURE: 97.7 F

## 2023-07-26 DIAGNOSIS — N39.46 MIXED INCONTINENCE: ICD-10-CM

## 2023-07-26 DIAGNOSIS — N39.0 RECURRENT UTI: Primary | ICD-10-CM

## 2023-07-26 LAB
BILIRUB BLD-MCNC: NEGATIVE MG/DL
CLARITY, POC: CLEAR
COLOR UR: YELLOW
GLUCOSE UR STRIP-MCNC: NEGATIVE MG/DL
KETONES UR QL: NEGATIVE
LEUKOCYTE EST, POC: NEGATIVE
NITRITE UR-MCNC: NEGATIVE MG/ML
PH UR: 6.5 [PH] (ref 5–8)
PROT UR STRIP-MCNC: NEGATIVE MG/DL
RBC # UR STRIP: ABNORMAL /UL
SP GR UR: 1.01 (ref 1–1.03)
UROBILINOGEN UR QL: ABNORMAL

## 2023-07-26 RX ORDER — OXYBUTYNIN CHLORIDE 10 MG/1
10 TABLET, EXTENDED RELEASE ORAL DAILY
Qty: 90 TABLET | Refills: 3 | Status: SHIPPED | OUTPATIENT
Start: 2023-07-26

## 2023-07-26 RX ORDER — SOLIFENACIN SUCCINATE 10 MG/1
10 TABLET, FILM COATED ORAL DAILY
Qty: 30 TABLET | Refills: 11 | Status: SHIPPED | OUTPATIENT
Start: 2023-07-26 | End: 2023-07-26

## 2023-08-12 NOTE — PROGRESS NOTES
CC: I am here for the doctor to look at my bladder    Cystoscopy procedure note  Pre- operative diagnosis:  Stress incontinence    Post operative diagnosis:  Same    Procedure:  The patient was prepped and draped in a normal sterile fashion.  The urethra was anesthetized with 2% lidocaine jelly.  A flexible cystoscope was introduced per urethra.   The urethra is normal in appearance without obstruction or mass.  The bladder has an erythematous area in the right posterolateral aspect that is concerning in appearance. I am concerned about the possibility of .   There is no abnormality of the urothelium.  There is minimal trabeculation of the detrusor muscle.  The ureteral orifices are orthotopic in position and they efflux clear urine.    Pelvic exam: Moderate leakage, about 25 degrees of vesical neck hypermobility with strain, no cystocele.     Patient tolerated the procedure well    Complications: none    Blood loss: minimal       ASSESSMENT AND PLAN          Problem List Items Addressed This Visit    None  Visit Diagnoses       Mixed incontinence    -  Primary    Relevant Orders    POC Urinalysis Dipstick, Multipro (Completed)    Erythematous bladder mucosa        Relevant Orders    Case Request (Completed)        Given these findings, I had to evaluate the patient to assess fitness for surgery, formulate and discussa plan, that included alternatives, risks and benefits of management.. This went well beyond the typical description of procedural findings and therefore an additional evaluation and management was required.      No follow-ups on file.      Andre Lowe MD  8/14/2023  16:51 CDT

## 2023-08-14 ENCOUNTER — PROCEDURE VISIT (OUTPATIENT)
Dept: UROLOGY | Facility: CLINIC | Age: 63
End: 2023-08-14
Payer: MEDICARE

## 2023-08-14 DIAGNOSIS — N32.89 ERYTHEMATOUS BLADDER MUCOSA: ICD-10-CM

## 2023-08-14 DIAGNOSIS — N39.46 MIXED INCONTINENCE: Primary | ICD-10-CM

## 2023-08-14 PROBLEM — N32.9 LESION OF BLADDER: Status: ACTIVE | Noted: 2023-08-14

## 2023-08-14 LAB
BILIRUB BLD-MCNC: NEGATIVE MG/DL
CLARITY, POC: CLEAR
COLOR UR: YELLOW
GLUCOSE UR STRIP-MCNC: NEGATIVE MG/DL
KETONES UR QL: NEGATIVE
LEUKOCYTE EST, POC: ABNORMAL
NITRITE UR-MCNC: NEGATIVE MG/ML
PH UR: 6 [PH] (ref 5–8)
PROT UR STRIP-MCNC: ABNORMAL MG/DL
RBC # UR STRIP: ABNORMAL /UL
SP GR UR: 1.01 (ref 1–1.03)
UROBILINOGEN UR QL: NORMAL

## 2023-08-14 RX ORDER — SODIUM CHLORIDE 450 MG/100ML
100 INJECTION, SOLUTION INTRAVENOUS CONTINUOUS
Status: CANCELLED | OUTPATIENT
Start: 2023-08-14

## 2023-08-14 NOTE — PROGRESS NOTES
Chief Complaint  Abnormal finding on cystoscopy    Subjective          Carmencita Napier presents to [facility] UROLOGY   Cystoscopy finding  The patient's cystoscopy today revealed an erythematous area as described in procedure note worrisome for urothelial neoplasm, potentially carcinoma in situ that will require further evaluation. This is a new diagnois finding warranting further evaluation. Onset of this is unknown. It was found in the context of cystoscopy being done for an evaluation of lower urinary tract symptoms.  Symptoms include irritative symptoms including frequency, urgency and stress incontinence.         Current Outpatient Medications:     ARIPiprazole (ABILIFY) 2 MG tablet, , Disp: , Rfl:     busPIRone (BUSPAR) 10 MG tablet, Take 1.5 tablets by mouth., Disp: , Rfl:     ergocalciferol (ERGOCALCIFEROL) 1.25 MG (18740 UT) capsule, Take 1 capsule by mouth., Disp: , Rfl:     escitalopram (LEXAPRO) 5 MG tablet, , Disp: , Rfl:     estradiol (ESTRACE) 0.1 MG/GM vaginal cream, Insert 2 g into the vagina 2 (Two) Times a Week., Disp: 42.5 g, Rfl: 12    levothyroxine (SYNTHROID, LEVOTHROID) 25 MCG tablet, , Disp: , Rfl:     oxybutynin XL (DITROPAN-XL) 10 MG 24 hr tablet, Take 1 tablet by mouth Daily., Disp: 90 tablet, Rfl: 3    pantoprazole (PROTONIX) 40 MG EC tablet, , Disp: , Rfl:     prazosin (MINIPRESS) 2 MG capsule, , Disp: , Rfl:     QUEtiapine (SEROquel) 50 MG tablet, Take 2 tablets by mouth Daily., Disp: , Rfl:     topiramate (TOPAMAX) 50 MG tablet, Take 1 tablet by mouth., Disp: , Rfl:   No past medical history on file.  Past Surgical History:   Procedure Laterality Date    BREAST BIOPSY Bilateral     benign    HYSTERECTOMY      OOPHORECTOMY       Review of Systems    Objective   PHYSICAL EXAM  Vital Signs:   There were no vitals taken for this visit.    Physical Exam  Constitutional: Well nourished, Well developed; No apparent distress, her vital signs are reviewed as recorded  above  Psychiatric: Appropriate affect; Alert and oriented  Eyes: Unremarkable  Musculoskeletal: Normal gait and station  GI: Abdomen is soft, non-tender  Respiratory: No distress; Unlabored movement; No accessory musculature needed with symmetric movements  Skin: No pallor or diaphoresis  : Labia normal; Urethral meatus normal position, not stenotic; No significant bladder prolapse.   Some vesical neck hypermobility, approximately 20 degrees, with leakage during straining.    DATA  Result Review :              Results for orders placed or performed in visit on 08/14/23   POC Urinalysis Dipstick, Multipro    Specimen: Urine   Result Value Ref Range    Color Yellow Yellow, Straw, Dark Yellow, Zahira    Clarity, UA Clear Clear    Glucose, UA Negative Negative mg/dL    Bilirubin Negative Negative    Ketones, UA Negative Negative    Specific Gravity  1.010 1.005 - 1.030    Blood, UA Moderate (A) Negative    pH, Urine 6.0 5.0 - 8.0    Protein, POC 30 mg/dL (A) Negative mg/dL    Urobilinogen, UA Normal Normal, 0.2 E.U./dL    Nitrite, UA Negative Negative    Leukocytes Trace (A) Negative       ASSESSMENT AND PLAN          Problem List Items Addressed This Visit    None  Visit Diagnoses       Mixed incontinence    -  Primary    Relevant Orders    POC Urinalysis Dipstick, Multipro (Completed)    Erythematous bladder mucosa        Relevant Orders    Case Request (Completed)        -Patient needs bladder biopsy.  I would also be concerned about doing the Bulkamid injection in this setting.  My thinking is that the patient had carcinoma in situ she would need intravesical BCG.  I would not want to inject a foreign substance periurethrally due to risk of infection.  Hopefully this is just an inflammatory response.      FOLLOW UP     No follow-ups on file.        (Please note that portions of this note were completed with a voice recognition program.)  Andre Lowe MD  08/14/23  16:52 CDT

## 2023-08-14 NOTE — H&P (VIEW-ONLY)
Chief Complaint  Abnormal finding on cystoscopy    Subjective          Carmencita Napier presents to [facility] UROLOGY   Cystoscopy finding  The patient's cystoscopy today revealed an erythematous area as described in procedure note worrisome for urothelial neoplasm, potentially carcinoma in situ that will require further evaluation. This is a new diagnois finding warranting further evaluation. Onset of this is unknown. It was found in the context of cystoscopy being done for an evaluation of lower urinary tract symptoms.  Symptoms include irritative symptoms including frequency, urgency and stress incontinence.         Current Outpatient Medications:     ARIPiprazole (ABILIFY) 2 MG tablet, , Disp: , Rfl:     busPIRone (BUSPAR) 10 MG tablet, Take 1.5 tablets by mouth., Disp: , Rfl:     ergocalciferol (ERGOCALCIFEROL) 1.25 MG (29553 UT) capsule, Take 1 capsule by mouth., Disp: , Rfl:     escitalopram (LEXAPRO) 5 MG tablet, , Disp: , Rfl:     estradiol (ESTRACE) 0.1 MG/GM vaginal cream, Insert 2 g into the vagina 2 (Two) Times a Week., Disp: 42.5 g, Rfl: 12    levothyroxine (SYNTHROID, LEVOTHROID) 25 MCG tablet, , Disp: , Rfl:     oxybutynin XL (DITROPAN-XL) 10 MG 24 hr tablet, Take 1 tablet by mouth Daily., Disp: 90 tablet, Rfl: 3    pantoprazole (PROTONIX) 40 MG EC tablet, , Disp: , Rfl:     prazosin (MINIPRESS) 2 MG capsule, , Disp: , Rfl:     QUEtiapine (SEROquel) 50 MG tablet, Take 2 tablets by mouth Daily., Disp: , Rfl:     topiramate (TOPAMAX) 50 MG tablet, Take 1 tablet by mouth., Disp: , Rfl:   No past medical history on file.  Past Surgical History:   Procedure Laterality Date    BREAST BIOPSY Bilateral     benign    HYSTERECTOMY      OOPHORECTOMY       Review of Systems    Objective   PHYSICAL EXAM  Vital Signs:   There were no vitals taken for this visit.    Physical Exam  Constitutional: Well nourished, Well developed; No apparent distress, her vital signs are reviewed as recorded  above  Psychiatric: Appropriate affect; Alert and oriented  Eyes: Unremarkable  Musculoskeletal: Normal gait and station  GI: Abdomen is soft, non-tender  Respiratory: No distress; Unlabored movement; No accessory musculature needed with symmetric movements  Skin: No pallor or diaphoresis  : Labia normal; Urethral meatus normal position, not stenotic; No significant bladder prolapse.   Some vesical neck hypermobility, approximately 20 degrees, with leakage during straining.    DATA  Result Review :              Results for orders placed or performed in visit on 08/14/23   POC Urinalysis Dipstick, Multipro    Specimen: Urine   Result Value Ref Range    Color Yellow Yellow, Straw, Dark Yellow, Zahira    Clarity, UA Clear Clear    Glucose, UA Negative Negative mg/dL    Bilirubin Negative Negative    Ketones, UA Negative Negative    Specific Gravity  1.010 1.005 - 1.030    Blood, UA Moderate (A) Negative    pH, Urine 6.0 5.0 - 8.0    Protein, POC 30 mg/dL (A) Negative mg/dL    Urobilinogen, UA Normal Normal, 0.2 E.U./dL    Nitrite, UA Negative Negative    Leukocytes Trace (A) Negative       ASSESSMENT AND PLAN          Problem List Items Addressed This Visit    None  Visit Diagnoses       Mixed incontinence    -  Primary    Relevant Orders    POC Urinalysis Dipstick, Multipro (Completed)    Erythematous bladder mucosa        Relevant Orders    Case Request (Completed)        -Patient needs bladder biopsy.  I would also be concerned about doing the Bulkamid injection in this setting.  My thinking is that the patient had carcinoma in situ she would need intravesical BCG.  I would not want to inject a foreign substance periurethrally due to risk of infection.  Hopefully this is just an inflammatory response.      FOLLOW UP     No follow-ups on file.        (Please note that portions of this note were completed with a voice recognition program.)  Andre Lowe MD  08/14/23  16:52 CDT

## 2023-08-15 ENCOUNTER — TELEPHONE (OUTPATIENT)
Dept: UROLOGY | Facility: CLINIC | Age: 63
End: 2023-08-15
Payer: MEDICARE

## 2023-08-15 NOTE — TELEPHONE ENCOUNTER
Called patient to remind them to arrive at patient registration on 8/16 at 7AM for the procedure with Dr. Lowe. Left message with patient. Told patient if they had any questions to please contact our office at [phone].

## 2023-08-16 ENCOUNTER — ANESTHESIA (OUTPATIENT)
Dept: PERIOP | Facility: HOSPITAL | Age: 63
End: 2023-08-16
Payer: MEDICARE

## 2023-08-16 ENCOUNTER — ANESTHESIA EVENT (OUTPATIENT)
Dept: PERIOP | Facility: HOSPITAL | Age: 63
End: 2023-08-16
Payer: MEDICARE

## 2023-08-16 ENCOUNTER — HOSPITAL ENCOUNTER (OUTPATIENT)
Facility: HOSPITAL | Age: 63
Setting detail: HOSPITAL OUTPATIENT SURGERY
Discharge: HOME | End: 2023-08-16
Attending: UROLOGY | Admitting: UROLOGY
Payer: MEDICARE

## 2023-08-16 VITALS
SYSTOLIC BLOOD PRESSURE: 121 MMHG | WEIGHT: 128.97 LBS | RESPIRATION RATE: 15 BRPM | HEIGHT: 67 IN | BODY MASS INDEX: 20.24 KG/M2 | DIASTOLIC BLOOD PRESSURE: 82 MMHG | OXYGEN SATURATION: 97 % | TEMPERATURE: 97.8 F | HEART RATE: 91 BPM

## 2023-08-16 DIAGNOSIS — N32.9 LESION OF BLADDER: ICD-10-CM

## 2023-08-16 DIAGNOSIS — N32.89 ERYTHEMATOUS BLADDER MUCOSA: ICD-10-CM

## 2023-08-16 LAB
DEPRECATED RDW RBC AUTO: 45.1 FL (ref 37–54)
ERYTHROCYTE [DISTWIDTH] IN BLOOD BY AUTOMATED COUNT: 13.6 % (ref 12.3–15.4)
HCT VFR BLD AUTO: 41.3 % (ref 34–46.6)
HGB BLD-MCNC: 12.6 G/DL (ref 12–15.9)
MCH RBC QN AUTO: 27.6 PG (ref 26.6–33)
MCHC RBC AUTO-ENTMCNC: 30.5 G/DL (ref 31.5–35.7)
MCV RBC AUTO: 90.4 FL (ref 79–97)
PLATELET # BLD AUTO: 269 10*3/MM3 (ref 140–450)
PMV BLD AUTO: 9.8 FL (ref 6–12)
RBC # BLD AUTO: 4.57 10*6/MM3 (ref 3.77–5.28)
WBC NRBC COR # BLD: 7.07 10*3/MM3 (ref 3.4–10.8)

## 2023-08-16 PROCEDURE — 25010000002 MIDAZOLAM PER 1 MG: Performed by: ANESTHESIOLOGY

## 2023-08-16 PROCEDURE — 25010000002 FENTANYL CITRATE (PF) 100 MCG/2ML SOLUTION: Performed by: NURSE ANESTHETIST, CERTIFIED REGISTERED

## 2023-08-16 PROCEDURE — 25010000002 PROPOFOL 10 MG/ML EMULSION: Performed by: NURSE ANESTHETIST, CERTIFIED REGISTERED

## 2023-08-16 PROCEDURE — 25010000002 ONDANSETRON PER 1 MG: Performed by: NURSE ANESTHETIST, CERTIFIED REGISTERED

## 2023-08-16 PROCEDURE — 25010000002 DEXAMETHASONE PER 1 MG: Performed by: ANESTHESIOLOGY

## 2023-08-16 PROCEDURE — 88305 TISSUE EXAM BY PATHOLOGIST: CPT | Performed by: UROLOGY

## 2023-08-16 PROCEDURE — 52235 CYSTOSCOPY AND TREATMENT: CPT | Performed by: UROLOGY

## 2023-08-16 PROCEDURE — 85027 COMPLETE CBC AUTOMATED: CPT | Performed by: UROLOGY

## 2023-08-16 PROCEDURE — 25010000002 CEFAZOLIN PER 500 MG: Performed by: UROLOGY

## 2023-08-16 PROCEDURE — 25010000002 DEXAMETHASONE PER 1 MG: Performed by: NURSE ANESTHETIST, CERTIFIED REGISTERED

## 2023-08-16 RX ORDER — HYDROCODONE BITARTRATE AND ACETAMINOPHEN 10; 325 MG/1; MG/1
1 TABLET ORAL EVERY 4 HOURS PRN
Status: DISCONTINUED | OUTPATIENT
Start: 2023-08-16 | End: 2023-08-16 | Stop reason: HOSPADM

## 2023-08-16 RX ORDER — SODIUM CHLORIDE 450 MG/100ML
100 INJECTION, SOLUTION INTRAVENOUS CONTINUOUS
Status: DISCONTINUED | OUTPATIENT
Start: 2023-08-16 | End: 2023-08-16 | Stop reason: HOSPADM

## 2023-08-16 RX ORDER — ACETAMINOPHEN 500 MG
1000 TABLET ORAL EVERY 6 HOURS PRN
Qty: 12 TABLET | Refills: 0
Start: 2023-08-16

## 2023-08-16 RX ORDER — SODIUM CHLORIDE 0.9 % (FLUSH) 0.9 %
3 SYRINGE (ML) INJECTION EVERY 12 HOURS SCHEDULED
Status: DISCONTINUED | OUTPATIENT
Start: 2023-08-16 | End: 2023-08-16 | Stop reason: HOSPADM

## 2023-08-16 RX ORDER — FLUMAZENIL 0.1 MG/ML
0.2 INJECTION INTRAVENOUS AS NEEDED
Status: DISCONTINUED | OUTPATIENT
Start: 2023-08-16 | End: 2023-08-16 | Stop reason: HOSPADM

## 2023-08-16 RX ORDER — SODIUM CHLORIDE, SODIUM LACTATE, POTASSIUM CHLORIDE, CALCIUM CHLORIDE 600; 310; 30; 20 MG/100ML; MG/100ML; MG/100ML; MG/100ML
1000 INJECTION, SOLUTION INTRAVENOUS CONTINUOUS
Status: DISCONTINUED | OUTPATIENT
Start: 2023-08-16 | End: 2023-08-16 | Stop reason: HOSPADM

## 2023-08-16 RX ORDER — NALOXONE HCL 0.4 MG/ML
0.4 VIAL (ML) INJECTION AS NEEDED
Status: DISCONTINUED | OUTPATIENT
Start: 2023-08-16 | End: 2023-08-16 | Stop reason: HOSPADM

## 2023-08-16 RX ORDER — SODIUM CHLORIDE, SODIUM LACTATE, POTASSIUM CHLORIDE, CALCIUM CHLORIDE 600; 310; 30; 20 MG/100ML; MG/100ML; MG/100ML; MG/100ML
100 INJECTION, SOLUTION INTRAVENOUS CONTINUOUS
Status: DISCONTINUED | OUTPATIENT
Start: 2023-08-16 | End: 2023-08-16 | Stop reason: HOSPADM

## 2023-08-16 RX ORDER — LIDOCAINE HYDROCHLORIDE 10 MG/ML
0.5 INJECTION, SOLUTION EPIDURAL; INFILTRATION; INTRACAUDAL; PERINEURAL ONCE AS NEEDED
Status: DISCONTINUED | OUTPATIENT
Start: 2023-08-16 | End: 2023-08-16 | Stop reason: HOSPADM

## 2023-08-16 RX ORDER — ACETAMINOPHEN 500 MG
1000 TABLET ORAL ONCE
Status: COMPLETED | OUTPATIENT
Start: 2023-08-16 | End: 2023-08-16

## 2023-08-16 RX ORDER — FENTANYL CITRATE 50 UG/ML
25 INJECTION, SOLUTION INTRAMUSCULAR; INTRAVENOUS
Status: DISCONTINUED | OUTPATIENT
Start: 2023-08-16 | End: 2023-08-16 | Stop reason: HOSPADM

## 2023-08-16 RX ORDER — DROPERIDOL 2.5 MG/ML
0.62 INJECTION, SOLUTION INTRAMUSCULAR; INTRAVENOUS ONCE AS NEEDED
Status: DISCONTINUED | OUTPATIENT
Start: 2023-08-16 | End: 2023-08-16 | Stop reason: HOSPADM

## 2023-08-16 RX ORDER — SODIUM CHLORIDE 9 MG/ML
40 INJECTION, SOLUTION INTRAVENOUS AS NEEDED
Status: DISCONTINUED | OUTPATIENT
Start: 2023-08-16 | End: 2023-08-16 | Stop reason: HOSPADM

## 2023-08-16 RX ORDER — DEXAMETHASONE SODIUM PHOSPHATE 4 MG/ML
4 INJECTION, SOLUTION INTRA-ARTICULAR; INTRALESIONAL; INTRAMUSCULAR; INTRAVENOUS; SOFT TISSUE ONCE AS NEEDED
Status: COMPLETED | OUTPATIENT
Start: 2023-08-16 | End: 2023-08-16

## 2023-08-16 RX ORDER — HYDROCODONE BITARTRATE AND ACETAMINOPHEN 5; 325 MG/1; MG/1
1 TABLET ORAL ONCE AS NEEDED
Status: DISCONTINUED | OUTPATIENT
Start: 2023-08-16 | End: 2023-08-16 | Stop reason: HOSPADM

## 2023-08-16 RX ORDER — ONDANSETRON 2 MG/ML
INJECTION INTRAMUSCULAR; INTRAVENOUS AS NEEDED
Status: DISCONTINUED | OUTPATIENT
Start: 2023-08-16 | End: 2023-08-16 | Stop reason: SURG

## 2023-08-16 RX ORDER — LABETALOL HYDROCHLORIDE 5 MG/ML
5 INJECTION, SOLUTION INTRAVENOUS
Status: DISCONTINUED | OUTPATIENT
Start: 2023-08-16 | End: 2023-08-16 | Stop reason: HOSPADM

## 2023-08-16 RX ORDER — MIDAZOLAM HYDROCHLORIDE 1 MG/ML
2 INJECTION INTRAMUSCULAR; INTRAVENOUS
Status: DISCONTINUED | OUTPATIENT
Start: 2023-08-16 | End: 2023-08-16 | Stop reason: HOSPADM

## 2023-08-16 RX ORDER — ONDANSETRON 2 MG/ML
4 INJECTION INTRAMUSCULAR; INTRAVENOUS ONCE AS NEEDED
Status: DISCONTINUED | OUTPATIENT
Start: 2023-08-16 | End: 2023-08-16 | Stop reason: HOSPADM

## 2023-08-16 RX ORDER — SODIUM CHLORIDE 0.9 % (FLUSH) 0.9 %
3 SYRINGE (ML) INJECTION AS NEEDED
Status: DISCONTINUED | OUTPATIENT
Start: 2023-08-16 | End: 2023-08-16 | Stop reason: HOSPADM

## 2023-08-16 RX ORDER — SODIUM CHLORIDE 0.9 % (FLUSH) 0.9 %
3-10 SYRINGE (ML) INJECTION AS NEEDED
Status: DISCONTINUED | OUTPATIENT
Start: 2023-08-16 | End: 2023-08-16 | Stop reason: HOSPADM

## 2023-08-16 RX ORDER — MAGNESIUM HYDROXIDE 1200 MG/15ML
LIQUID ORAL AS NEEDED
Status: DISCONTINUED | OUTPATIENT
Start: 2023-08-16 | End: 2023-08-16 | Stop reason: HOSPADM

## 2023-08-16 RX ORDER — SORBITOL 30 G/1000ML
IRRIGANT IRRIGATION AS NEEDED
Status: DISCONTINUED | OUTPATIENT
Start: 2023-08-16 | End: 2023-08-16 | Stop reason: HOSPADM

## 2023-08-16 RX ORDER — FENTANYL CITRATE 50 UG/ML
INJECTION, SOLUTION INTRAMUSCULAR; INTRAVENOUS AS NEEDED
Status: DISCONTINUED | OUTPATIENT
Start: 2023-08-16 | End: 2023-08-16 | Stop reason: SURG

## 2023-08-16 RX ORDER — PROPOFOL 10 MG/ML
VIAL (ML) INTRAVENOUS AS NEEDED
Status: DISCONTINUED | OUTPATIENT
Start: 2023-08-16 | End: 2023-08-16 | Stop reason: SURG

## 2023-08-16 RX ORDER — CEPHALEXIN 500 MG/1
500 CAPSULE ORAL 2 TIMES DAILY
Qty: 4 CAPSULE | Refills: 0 | Status: SHIPPED | OUTPATIENT
Start: 2023-08-16

## 2023-08-16 RX ORDER — DEXAMETHASONE SODIUM PHOSPHATE 4 MG/ML
INJECTION, SOLUTION INTRA-ARTICULAR; INTRALESIONAL; INTRAMUSCULAR; INTRAVENOUS; SOFT TISSUE AS NEEDED
Status: DISCONTINUED | OUTPATIENT
Start: 2023-08-16 | End: 2023-08-16 | Stop reason: SURG

## 2023-08-16 RX ORDER — IBUPROFEN 600 MG/1
600 TABLET ORAL ONCE AS NEEDED
Status: DISCONTINUED | OUTPATIENT
Start: 2023-08-16 | End: 2023-08-16 | Stop reason: HOSPADM

## 2023-08-16 RX ORDER — EPHEDRINE SULFATE 50 MG/ML
INJECTION, SOLUTION INTRAVENOUS AS NEEDED
Status: DISCONTINUED | OUTPATIENT
Start: 2023-08-16 | End: 2023-08-16 | Stop reason: SURG

## 2023-08-16 RX ORDER — LIDOCAINE HYDROCHLORIDE 20 MG/ML
INJECTION, SOLUTION EPIDURAL; INFILTRATION; INTRACAUDAL; PERINEURAL AS NEEDED
Status: DISCONTINUED | OUTPATIENT
Start: 2023-08-16 | End: 2023-08-16 | Stop reason: SURG

## 2023-08-16 RX ADMIN — DEXAMETHASONE SODIUM PHOSPHATE 4 MG: 4 INJECTION, SOLUTION INTRAMUSCULAR; INTRAVENOUS at 12:05

## 2023-08-16 RX ADMIN — EPHEDRINE SULFATE 25 MG: 50 INJECTION INTRAVENOUS at 12:11

## 2023-08-16 RX ADMIN — MIDAZOLAM HYDROCHLORIDE 2 MG: 1 INJECTION, SOLUTION INTRAMUSCULAR; INTRAVENOUS at 11:39

## 2023-08-16 RX ADMIN — FENTANYL CITRATE 50 MCG: 50 INJECTION, SOLUTION INTRAMUSCULAR; INTRAVENOUS at 11:57

## 2023-08-16 RX ADMIN — SODIUM CHLORIDE 1000 MG: 900 INJECTION INTRAVENOUS at 12:02

## 2023-08-16 RX ADMIN — SODIUM CHLORIDE, POTASSIUM CHLORIDE, SODIUM LACTATE AND CALCIUM CHLORIDE 1000 ML: 600; 310; 30; 20 INJECTION, SOLUTION INTRAVENOUS at 08:35

## 2023-08-16 RX ADMIN — ONDANSETRON 4 MG: 2 INJECTION INTRAMUSCULAR; INTRAVENOUS at 12:05

## 2023-08-16 RX ADMIN — FENTANYL CITRATE 50 MCG: 50 INJECTION, SOLUTION INTRAMUSCULAR; INTRAVENOUS at 12:03

## 2023-08-16 RX ADMIN — DEXAMETHASONE SODIUM PHOSPHATE 4 MG: 4 INJECTION INTRA-ARTICULAR; INTRALESIONAL; INTRAMUSCULAR; INTRAVENOUS; SOFT TISSUE at 11:39

## 2023-08-16 RX ADMIN — PROPOFOL INJECTABLE EMULSION 150 MG: 10 INJECTION, EMULSION INTRAVENOUS at 11:58

## 2023-08-16 RX ADMIN — ACETAMINOPHEN 1000 MG: 500 TABLET, FILM COATED ORAL at 11:39

## 2023-08-16 RX ADMIN — LIDOCAINE HYDROCHLORIDE 100 MG: 20 INJECTION, SOLUTION EPIDURAL; INFILTRATION; INTRACAUDAL; PERINEURAL at 11:58

## 2023-08-16 NOTE — ANESTHESIA PROCEDURE NOTES
Airway  Urgency: elective    Date/Time: 8/16/2023 11:59 AM  Airway not difficult    General Information and Staff    Patient location during procedure: OR    Indications and Patient Condition  Indications for airway management: airway protection    Preoxygenated: yes  MILS maintained throughout  Mask difficulty assessment: 0 - not attempted    Final Airway Details  Final airway type: supraglottic airway      Successful airway: I-gel  Size 4     Assessment: lips, teeth, and gum same as pre-op

## 2023-08-16 NOTE — ANESTHESIA PREPROCEDURE EVALUATION
Anesthesia Evaluation     Patient summary reviewed   no history of anesthetic complications:   NPO Solid Status: > 8 hours  NPO Liquid Status: > 8 hours           Airway   Mallampati: I  TM distance: >3 FB  Neck ROM: full  Dental          Pulmonary - negative pulmonary ROS   (-) asthma, sleep apnea, not a smoker  Cardiovascular   Exercise tolerance: good (4-7 METS)        Neuro/Psych  (-) seizures, TIA, CVA  GI/Hepatic/Renal/Endo    (+) GERD, thyroid problem hypothyroidism  (-) liver disease, no renal disease, diabetes    Musculoskeletal     Abdominal    Substance History      OB/GYN          Other        ROS/Med Hx Other: Bladder lesion                Anesthesia Plan    ASA 2     general     intravenous induction     Anesthetic plan, risks, benefits, and alternatives have been provided, discussed and informed consent has been obtained with: patient.    CODE STATUS:

## 2023-08-16 NOTE — ANESTHESIA POSTPROCEDURE EVALUATION
"Patient: Carmencita Napier    Procedure Summary       Date: 08/16/23 Room / Location:  [facility] OR  /  [facility] OR    Anesthesia Start: 1155 Anesthesia Stop: 1229    Procedure: CYSTOSCOPY with BLADDER BIOPSY (Bladder) Diagnosis:       Lesion of bladder      (Lesion of bladder [N32.9])    Surgeons: Andre Lowe MD Provider: Abdiaziz Del Rosario CRNA    Anesthesia Type: general ASA Status: 2            Anesthesia Type: general    Vitals  Vitals Value Taken Time   /70 08/16/23 1245   Temp 97.8 øF (36.6 øC) 08/16/23 1245   Pulse 78 08/16/23 1245   Resp 15 08/16/23 1245   SpO2 98 % 08/16/23 1245           Post Anesthesia Care and Evaluation    Patient location during evaluation: PACU  Patient participation: complete - patient participated  Level of consciousness: awake and alert  Pain management: adequate    Airway patency: patent  Anesthetic complications: No anesthetic complications    Cardiovascular status: acceptable  Respiratory status: acceptable  Hydration status: acceptable    Comments: Blood pressure 110/77, pulse 77, temperature 97.8 øF (36.6 øC), temperature source Temporal, resp. rate 15, height 170 cm (66.93\"), weight 58.5 kg (128 lb 15.5 oz), SpO2 98 %.    Pt discharged from PACU based on rachel score >8    "

## 2023-08-16 NOTE — OP NOTE
Operative Summary    Carmencita Napier  Date of Procedure: 8/16/2023    Pre-op Diagnosis:   Lesion of bladder [N32.9]    Post-op Diagnosis:     Post-Op Diagnosis Codes:     * Lesion of bladder [N32.9]    Procedure/CPTr Codes:      Procedure(s):  CYSTOSCOPY with BLADDER BIOPSY    Surgeon(s):  Andre Lowe MD    Anesthesia: General    Staff:   * No surgical staff found *    Indications for procedure:  Incidental finding of bladder erythema on cystoscopy    Findings:   Patient has about a 2 cm x 2 cm area of bladder erythema that was noted on cystoscopy yesterday.  There are no papillary lesions.  I fulgurated this area of erythema in its entirety.    Procedure details:  After appropriate anesthesia, positioning, prep and drape, timeout protocol was observed.     23 German Jones cystoscope was inserted.  30 and 70 degree lens inspection of bladder revealed the lesion described above.  I used a Taughber forceps to do 3 biopsies from this.  I then used a William hook electrocautery and fulgurated the remaining portion of this.    Bladder was drained.  Patient is then taken to recovery room in good condition    Estimated Blood Loss: Less than 30 mL    Specimens:                Specimens       ID Source Type Tests Collected By Collected At Frozen?    A Urinary Bladder Tissue TISSUE PATHOLOGY EXAM   Andre Lowe MD 8/16/23 0817     Description: BLADDER ERYTHEMA POSTERIOR BLADDER WALL TUMOR              Drains: none    Complications: none    Plan: Biopsy results.  She is a good candidate for Bulkamid to treat her stress incontinence.      (Please note that portions of this note were completed with a voice recognition program.)  Andre Lowe MD     Date: 8/16/2023  Time: 12:31 CDT

## 2023-08-17 LAB
CYTO UR: NORMAL
LAB AP CASE REPORT: NORMAL
Lab: NORMAL
PATH REPORT.FINAL DX SPEC: NORMAL
PATH REPORT.GROSS SPEC: NORMAL

## 2023-08-18 ENCOUNTER — TELEPHONE (OUTPATIENT)
Dept: UROLOGY | Facility: CLINIC | Age: 63
End: 2023-08-18
Payer: MEDICARE

## 2023-08-18 ENCOUNTER — PREP FOR SURGERY (OUTPATIENT)
Dept: OTHER | Facility: HOSPITAL | Age: 63
End: 2023-08-18
Payer: MEDICARE

## 2023-08-18 DIAGNOSIS — N39.3 STRESS INCONTINENCE: Primary | ICD-10-CM

## 2023-08-18 NOTE — TELEPHONE ENCOUNTER
Spoke to patient and let her know of her negative biopsy results. I let patient know I currently have her scheduled for Bulkamid Monday September 11th. Patient verbalized understanding.

## 2023-08-18 NOTE — TELEPHONE ENCOUNTER
----- Message from Andre Lowe MD sent at 8/18/2023  1:05 PM CDT -----  Left message about her negative biopsy results. Told her we would go ahead and schedule her Bulkamid

## 2023-09-05 ENCOUNTER — TELEPHONE (OUTPATIENT)
Dept: UROLOGY | Facility: CLINIC | Age: 63
End: 2023-09-05
Payer: MEDICARE

## 2023-09-18 ENCOUNTER — TRANSCRIBE ORDERS (OUTPATIENT)
Dept: ADMINISTRATIVE | Facility: HOSPITAL | Age: 63
End: 2023-09-18
Payer: MEDICARE

## 2023-09-18 DIAGNOSIS — Z12.31 ENCOUNTER FOR SCREENING MAMMOGRAM FOR MALIGNANT NEOPLASM OF BREAST: Primary | ICD-10-CM

## 2023-10-02 ENCOUNTER — PRE-ADMISSION TESTING (OUTPATIENT)
Dept: PREADMISSION TESTING | Facility: HOSPITAL | Age: 63
End: 2023-10-02
Payer: MEDICARE

## 2023-10-02 ENCOUNTER — HOSPITAL ENCOUNTER (OUTPATIENT)
Dept: MAMMOGRAPHY | Facility: HOSPITAL | Age: 63
Discharge: HOME | End: 2023-10-02
Payer: MEDICARE

## 2023-10-02 VITALS
DIASTOLIC BLOOD PRESSURE: 80 MMHG | HEART RATE: 78 BPM | BODY MASS INDEX: 20.07 KG/M2 | WEIGHT: 127.87 LBS | SYSTOLIC BLOOD PRESSURE: 102 MMHG | RESPIRATION RATE: 18 BRPM | HEIGHT: 67 IN | OXYGEN SATURATION: 98 %

## 2023-10-02 DIAGNOSIS — Z12.31 ENCOUNTER FOR SCREENING MAMMOGRAM FOR MALIGNANT NEOPLASM OF BREAST: ICD-10-CM

## 2023-10-02 LAB
ANION GAP SERPL CALCULATED.3IONS-SCNC: 10 MMOL/L (ref 5–15)
BUN SERPL-MCNC: 14 MG/DL (ref 8–23)
BUN/CREAT SERPL: 15.4 (ref 7–25)
CALCIUM SPEC-SCNC: 8.8 MG/DL (ref 8.6–10.5)
CHLORIDE SERPL-SCNC: 109 MMOL/L (ref 98–107)
CO2 SERPL-SCNC: 24 MMOL/L (ref 22–29)
CREAT SERPL-MCNC: 0.91 MG/DL (ref 0.57–1)
DEPRECATED RDW RBC AUTO: 46 FL (ref 37–54)
EGFRCR SERPLBLD CKD-EPI 2021: 72.4 ML/MIN/1.73
ERYTHROCYTE [DISTWIDTH] IN BLOOD BY AUTOMATED COUNT: 13.9 % (ref 12.3–15.4)
GLUCOSE SERPL-MCNC: 88 MG/DL (ref 65–99)
HCT VFR BLD AUTO: 43.1 % (ref 34–46.6)
HGB BLD-MCNC: 13.1 G/DL (ref 12–15.9)
MCH RBC QN AUTO: 27.4 PG (ref 26.6–33)
MCHC RBC AUTO-ENTMCNC: 30.4 G/DL (ref 31.5–35.7)
MCV RBC AUTO: 90.2 FL (ref 79–97)
PLATELET # BLD AUTO: 246 10*3/MM3 (ref 140–450)
PMV BLD AUTO: 10.5 FL (ref 6–12)
POTASSIUM SERPL-SCNC: 3.6 MMOL/L (ref 3.5–5.2)
RBC # BLD AUTO: 4.78 10*6/MM3 (ref 3.77–5.28)
SODIUM SERPL-SCNC: 143 MMOL/L (ref 136–145)
WBC NRBC COR # BLD: 6.28 10*3/MM3 (ref 3.4–10.8)

## 2023-10-02 PROCEDURE — 80048 BASIC METABOLIC PNL TOTAL CA: CPT

## 2023-10-02 PROCEDURE — 85027 COMPLETE CBC AUTOMATED: CPT

## 2023-10-02 PROCEDURE — 77063 BREAST TOMOSYNTHESIS BI: CPT

## 2023-10-02 PROCEDURE — 77067 SCR MAMMO BI INCL CAD: CPT

## 2023-10-02 PROCEDURE — 36415 COLL VENOUS BLD VENIPUNCTURE: CPT

## 2023-10-02 NOTE — DISCHARGE INSTRUCTIONS
Before you come to the hospital        Arrival time: AS DIRECTED BY OFFICE     YOU MAY TAKE THE FOLLOWING MEDICATION(S) THE MORNING OF SURGERY WITH A SIP OF WATER: protonix, buspar           ALL OTHER HOME MEDICATION CHECK WITH YOUR PHYSICIAN (especially if   you are taking diabetes medicines or blood thinners)    Do not take any Erectile Dysfunction medications (EX: CIALIS, VIAGRA) 24 hours prior to surgery.      If you were given and instructed to use a germ- killing soap, use as directed the night before surgery and again the morning of surgery or as directed by your surgeon. (Use one-half of the bottle with each shower.)   See attached information for How to Use Chlorhexidine for Bathing if applicable.            Eating and drinking restrictions prior to scheduled arrival time    2 Hours before arrival time STOP   Drinking Clear liquids (water, black coffee-NO CREAM,  apple juice-no pulp)      8 Hours before arrival time STOP   All food, full liquids, and dairy products    (It is extremely important that you follow these guidelines to prevent delay or cancelation of your procedure)     Clear Liquids  Water and flavored water                                                                      Clear Fruit juices, such as cranberry juice and apple juice.  Black coffee (NO cream of any kind, including powdered).  Plain tea  Clear bouillon or broth.  Flavored gelatin.  Soda.  Gatorade or Powerade.  Full liquid examples  Juices that have pulp.  Frozen ice pops that contain fruit pieces.  Coffee with creamer  Milk.  Yogurt.                MANAGING PAIN AFTER SURGERY    We know you are probably wondering what your pain will be like after surgery.  Following surgery it is unrealistic to expect you will not have pain.   Pain is how our bodies let us know that something is wrong or cautions us to be careful.  That said, our goal is to make your pain tolerable.    Methods we may use to treat your pain include (oral or IV  medications, PCAs, epidurals, nerve blocks, etc.)   While some procedures require IV pain medications for a short time after surgery, transitioning to pain medications by mouth allows for better management of pain.   Your nurse will encourage you to take oral pain medications whenever possible.  IV medications work almost immediately, but only last a short while.  Taking medications by mouth allows for a more constant level of medication in your blood stream for a longer period of time.      Once your pain is out of control it is harder to get back under control.  It is important you are aware when your next dose of pain medication is due.  If you are admitted, your nurse may write the time of your next dose on the white board in your room to help you remember.      We are interested in your pain and encourage you to inform us about aggravating factors during your visit.   Many times a simple repositioning every few hours can make a big difference.    If your physician says it is okay, do not let your pain prevent you from getting out of bed. Be sure to call your nurse for assistance prior to getting up so you do not fall.      Before surgery, please decide your tolerable pain goal.  These faces help describe the pain ratings we use on a 0-10 scale.   Be prepared to tell us your goal and whether or not you take pain or anxiety medications at home.          Preparing for Surgery  Preparing for surgery is an important part of your care. It can make things go more smoothly and help you avoid complications. The steps leading up to surgery may vary among hospitals. Follow all instructions given to you by your health care providers. Ask questions if you do not understand something. Talk about any concerns that you have.  Here are some questions to consider asking before your surgery:  If my surgery is not an emergency (is elective), when would be the best time to have the surgery?  What arrangements do I need to make for  work, home, or school?  What will my recovery be like? How long will it be before I can return to normal activities?  Will I need to prepare my home? Will I need to arrange care for me or my children?  Should I expect to have pain after surgery? What are my pain management options? Are there nonmedical options that I can try for pain?  Tell a health care provider about:  Any allergies you have.  All medicines you are taking, including vitamins, herbs, eye drops, creams, and over-the-counter medicines.  Any problems you or family members have had with anesthetic medicines.  Any blood disorders you have.  Any surgeries you have had.  Any medical conditions you have.  Whether you are pregnant or may be pregnant.  What are the risks?  The risks and complications of surgery depend on the specific procedure that you have. Discuss all the risks with your health care providers before your surgery. Ask about common surgical complications, which may include:  Infection.  Bleeding or a need for blood replacement (transfusion).  Allergic reactions to medicines.  Damage to surrounding nerves, tissues, or structures.  A blood clot.  Scarring.  Failure of the surgery to correct the problem.  Follow these instructions before the procedure:  Several days or weeks before your procedure  You may have a physical exam by your primary health care provider to make sure it is safe for you to have surgery.  You may have testing. This may include a chest X-ray, blood and urine tests, electrocardiogram (ECG), or other testing.  Ask your health care provider about:  Changing or stopping your regular medicines. This is especially important if you are taking diabetes medicines or blood thinners.  Taking medicines such as aspirin and ibuprofen. These medicines can thin your blood. Do not take these medicines unless your health care provider tells you to take them.  Taking over-the-counter medicines, vitamins, herbs, and supplements.  Do not use  any products that contain nicotine or tobacco, such as cigarettes and e-cigarettes. If you need help quitting, ask your health care provider.  Avoid alcohol.  Ask your health care provider if there are exercises you can do to prepare for surgery.  Eat a healthy diet.   Plan to have someone 18 years of age or older to take you home from the hospital. We will need to verify your ride on the morning of surgery if you are being discharged home on the same day. Tell your ride to be expecting a call from the hospital prior to your procedure.   Plan to have a responsible adult care for you for at least 24 hours after you leave the hospital or clinic. This is important.  The day before your procedure  You may be given antibiotic medicine to take by mouth to help prevent infection. Take it as told by your health care provider.  You may be asked to shower with a germ-killing soap.  Follow instructions from your health care provider about eating and drinking restrictions. This includes gum, mints and hard candy.  Pack comfortable clothes according to your procedure.   The day of your procedure  You may need to take another shower with a germ-killing soap before you leave home in the morning.  With a small sip of water, take only the medicines that you are told to take.  Remove all jewelry including rings.   Leave anything you consider valuable at home except hearing aids if needed.  You do not need to bring your home medications into the hospital.   Do not wear any makeup, nail polish, powder, deodorant, lotion, hair accessories, or anything on your skin or body except your clothes.  If you will be staying in the hospital, bring a case to hold your glasses, contacts, or dentures. You may also want to bring your robe and non-skid footwear.       (Do not use denture adhesives since you will be asked to remove them during  surgery).   If you wear oxygen at home, bring it with you the day of surgery.  If instructed by your health  care provider, bring your sleep apnea device with you on the day of your surgery (if this applies to you).  You may want to leave your suitcase and sleep apnea device in the car until after surgery.   Arrive at the hospital as scheduled.  Bring a friend or family member with you who can help to answer questions and be present while you meet with your health care provider.  At the hospital  When you arrive at the hospital:  Go to registration located at the main entrance of the hospital. You will be registered and given a beeper and a sticker sheet. Take the stickers to the Outpatient nurses desk and place in the black tray. This is to notify staff that you have arrived. Then return to the lobby to wait.   When your beeper lights up and vibrates proceed through the double doors, under the stairs, and a member of the Outpatient Surgery staff will escort you to your preoperative room.  You may have to wear compression sleeves. These help to prevent blood clots and reduce swelling in your legs.  An IV may be inserted into one of your veins.              In the operating room, you may be given one or more of the following:        A medicine to help you relax (sedative).        A medicine to numb the area (local anesthetic).        A medicine to make you fall asleep (general anesthetic).        A medicine that is injected into an area of your body to numb everything below the                      injection site (regional anesthetic).  You may be given an antibiotic through your IV to help prevent infection.  Your surgical site will be marked or identified.    Contact a health care provider if you:  Develop a fever of more than 100.4°F (38°C) or other feelings of illness during the 48 hours before your surgery.  Have symptoms that get worse.  Have questions or concerns about your surgery.  Summary  Preparing for surgery can make the procedure go more smoothly and lower your risk of complications.  Before surgery, make a  list of questions and concerns to discuss with your surgeon. Ask about the risks and possible complications.  In the days or weeks before your surgery, follow all instructions from your health care provider. You may need to stop smoking, avoid alcohol, follow eating restrictions, and change or stop your regular medicines.  Contact your surgeon if you develop a fever or other signs of illness during the few days before your surgery.  This information is not intended to replace advice given to you by your health care provider. Make sure you discuss any questions you have with your health care provider.  Document Revised: 12/21/2018 Document Reviewed: 10/23/2018  Elsevier Patient Education © 2021 [employer]

## 2023-10-06 ENCOUNTER — TELEPHONE (OUTPATIENT)
Dept: UROLOGY | Facility: CLINIC | Age: 63
End: 2023-10-06
Payer: MEDICARE

## 2023-10-06 NOTE — TELEPHONE ENCOUNTER
Called patient to remind them to arrive at patient registration on 10/9 at 530AM for the procedure with Dr. Lowe. Left message with patient. Told patient if they had any questions to please contact our office at [phone].

## 2023-10-09 ENCOUNTER — ANESTHESIA (OUTPATIENT)
Dept: PERIOP | Facility: HOSPITAL | Age: 63
End: 2023-10-09
Payer: MEDICARE

## 2023-10-09 ENCOUNTER — HOSPITAL ENCOUNTER (OUTPATIENT)
Facility: HOSPITAL | Age: 63
Setting detail: HOSPITAL OUTPATIENT SURGERY
Discharge: HOME | End: 2023-10-09
Attending: UROLOGY | Admitting: UROLOGY
Payer: MEDICARE

## 2023-10-09 ENCOUNTER — ANESTHESIA EVENT (OUTPATIENT)
Dept: PERIOP | Facility: HOSPITAL | Age: 63
End: 2023-10-09
Payer: MEDICARE

## 2023-10-09 VITALS
SYSTOLIC BLOOD PRESSURE: 114 MMHG | DIASTOLIC BLOOD PRESSURE: 67 MMHG | TEMPERATURE: 97.4 F | RESPIRATION RATE: 16 BRPM | OXYGEN SATURATION: 93 % | HEART RATE: 60 BPM

## 2023-10-09 DIAGNOSIS — N39.3 STRESS INCONTINENCE: ICD-10-CM

## 2023-10-09 DIAGNOSIS — N32.9 LESION OF BLADDER: Primary | ICD-10-CM

## 2023-10-09 PROCEDURE — 25010000002 PROPOFOL 10 MG/ML EMULSION

## 2023-10-09 PROCEDURE — 25810000003 LACTATED RINGERS PER 1000 ML: Performed by: UROLOGY

## 2023-10-09 PROCEDURE — 25010000002 CEFAZOLIN 1-4 GM/50ML-% SOLUTION: Performed by: UROLOGY

## 2023-10-09 PROCEDURE — 25010000002 ONDANSETRON PER 1 MG

## 2023-10-09 PROCEDURE — L8606 SYNTHETIC IMPLNT URINARY 1ML: HCPCS | Performed by: UROLOGY

## 2023-10-09 DEVICE — BULKAMID URETHRAL BULKING SYSTEM 2ML
Type: IMPLANTABLE DEVICE | Site: BLADDER | Status: FUNCTIONAL
Brand: BULKAMID

## 2023-10-09 RX ORDER — HYDROCODONE BITARTRATE AND ACETAMINOPHEN 5; 325 MG/1; MG/1
1 TABLET ORAL EVERY 8 HOURS PRN
Qty: 6 TABLET | Refills: 0 | Status: SHIPPED | OUTPATIENT
Start: 2023-10-09

## 2023-10-09 RX ORDER — ONDANSETRON 2 MG/ML
4 INJECTION INTRAMUSCULAR; INTRAVENOUS ONCE AS NEEDED
Status: DISCONTINUED | OUTPATIENT
Start: 2023-10-09 | End: 2023-10-09 | Stop reason: HOSPADM

## 2023-10-09 RX ORDER — IBUPROFEN 600 MG/1
600 TABLET ORAL ONCE AS NEEDED
Status: DISCONTINUED | OUTPATIENT
Start: 2023-10-09 | End: 2023-10-09 | Stop reason: HOSPADM

## 2023-10-09 RX ORDER — HYDROCODONE BITARTRATE AND ACETAMINOPHEN 10; 325 MG/1; MG/1
1 TABLET ORAL EVERY 4 HOURS PRN
Status: DISCONTINUED | OUTPATIENT
Start: 2023-10-09 | End: 2023-10-09 | Stop reason: HOSPADM

## 2023-10-09 RX ORDER — MAGNESIUM HYDROXIDE 1200 MG/15ML
LIQUID ORAL AS NEEDED
Status: DISCONTINUED | OUTPATIENT
Start: 2023-10-09 | End: 2023-10-09 | Stop reason: HOSPADM

## 2023-10-09 RX ORDER — SODIUM CHLORIDE, SODIUM LACTATE, POTASSIUM CHLORIDE, CALCIUM CHLORIDE 600; 310; 30; 20 MG/100ML; MG/100ML; MG/100ML; MG/100ML
100 INJECTION, SOLUTION INTRAVENOUS CONTINUOUS
Status: DISCONTINUED | OUTPATIENT
Start: 2023-10-09 | End: 2023-10-09 | Stop reason: HOSPADM

## 2023-10-09 RX ORDER — HYDROCODONE BITARTRATE AND ACETAMINOPHEN 5; 325 MG/1; MG/1
1 TABLET ORAL ONCE AS NEEDED
Status: DISCONTINUED | OUTPATIENT
Start: 2023-10-09 | End: 2023-10-09 | Stop reason: HOSPADM

## 2023-10-09 RX ORDER — SODIUM CHLORIDE, SODIUM LACTATE, POTASSIUM CHLORIDE, CALCIUM CHLORIDE 600; 310; 30; 20 MG/100ML; MG/100ML; MG/100ML; MG/100ML
1000 INJECTION, SOLUTION INTRAVENOUS CONTINUOUS
Status: DISCONTINUED | OUTPATIENT
Start: 2023-10-09 | End: 2023-10-09 | Stop reason: HOSPADM

## 2023-10-09 RX ORDER — SODIUM CHLORIDE 0.9 % (FLUSH) 0.9 %
3-10 SYRINGE (ML) INJECTION AS NEEDED
Status: DISCONTINUED | OUTPATIENT
Start: 2023-10-09 | End: 2023-10-09 | Stop reason: HOSPADM

## 2023-10-09 RX ORDER — PROPOFOL 10 MG/ML
VIAL (ML) INTRAVENOUS AS NEEDED
Status: DISCONTINUED | OUTPATIENT
Start: 2023-10-09 | End: 2023-10-09 | Stop reason: SURG

## 2023-10-09 RX ORDER — SODIUM CHLORIDE 0.9 % (FLUSH) 0.9 %
3 SYRINGE (ML) INJECTION EVERY 12 HOURS SCHEDULED
Status: DISCONTINUED | OUTPATIENT
Start: 2023-10-09 | End: 2023-10-09 | Stop reason: HOSPADM

## 2023-10-09 RX ORDER — MIDAZOLAM HYDROCHLORIDE 1 MG/ML
1 INJECTION INTRAMUSCULAR; INTRAVENOUS
Status: DISCONTINUED | OUTPATIENT
Start: 2023-10-09 | End: 2023-10-09 | Stop reason: HOSPADM

## 2023-10-09 RX ORDER — CEFAZOLIN SODIUM 1 G/50ML
1 INJECTION, SOLUTION INTRAVENOUS ONCE
Status: COMPLETED | OUTPATIENT
Start: 2023-10-10 | End: 2023-10-09

## 2023-10-09 RX ORDER — LIDOCAINE HYDROCHLORIDE 20 MG/ML
INJECTION, SOLUTION EPIDURAL; INFILTRATION; INTRACAUDAL; PERINEURAL AS NEEDED
Status: DISCONTINUED | OUTPATIENT
Start: 2023-10-09 | End: 2023-10-09 | Stop reason: SURG

## 2023-10-09 RX ORDER — ONDANSETRON 2 MG/ML
INJECTION INTRAMUSCULAR; INTRAVENOUS AS NEEDED
Status: DISCONTINUED | OUTPATIENT
Start: 2023-10-09 | End: 2023-10-09 | Stop reason: SURG

## 2023-10-09 RX ORDER — LIDOCAINE HYDROCHLORIDE 10 MG/ML
0.5 INJECTION, SOLUTION EPIDURAL; INFILTRATION; INTRACAUDAL; PERINEURAL ONCE AS NEEDED
Status: DISCONTINUED | OUTPATIENT
Start: 2023-10-09 | End: 2023-10-09 | Stop reason: HOSPADM

## 2023-10-09 RX ORDER — LABETALOL HYDROCHLORIDE 5 MG/ML
5 INJECTION, SOLUTION INTRAVENOUS
Status: DISCONTINUED | OUTPATIENT
Start: 2023-10-09 | End: 2023-10-09 | Stop reason: HOSPADM

## 2023-10-09 RX ORDER — FENTANYL CITRATE 50 UG/ML
25 INJECTION, SOLUTION INTRAMUSCULAR; INTRAVENOUS
Status: DISCONTINUED | OUTPATIENT
Start: 2023-10-09 | End: 2023-10-09 | Stop reason: HOSPADM

## 2023-10-09 RX ORDER — SODIUM CHLORIDE 9 MG/ML
40 INJECTION, SOLUTION INTRAVENOUS AS NEEDED
Status: DISCONTINUED | OUTPATIENT
Start: 2023-10-09 | End: 2023-10-09 | Stop reason: HOSPADM

## 2023-10-09 RX ORDER — DROPERIDOL 2.5 MG/ML
0.62 INJECTION, SOLUTION INTRAMUSCULAR; INTRAVENOUS ONCE AS NEEDED
Status: DISCONTINUED | OUTPATIENT
Start: 2023-10-09 | End: 2023-10-09 | Stop reason: HOSPADM

## 2023-10-09 RX ORDER — SODIUM CHLORIDE 0.9 % (FLUSH) 0.9 %
3 SYRINGE (ML) INJECTION AS NEEDED
Status: DISCONTINUED | OUTPATIENT
Start: 2023-10-09 | End: 2023-10-09 | Stop reason: HOSPADM

## 2023-10-09 RX ORDER — FLUMAZENIL 0.1 MG/ML
0.2 INJECTION INTRAVENOUS AS NEEDED
Status: DISCONTINUED | OUTPATIENT
Start: 2023-10-09 | End: 2023-10-09 | Stop reason: HOSPADM

## 2023-10-09 RX ORDER — NALOXONE HCL 0.4 MG/ML
0.4 VIAL (ML) INJECTION AS NEEDED
Status: DISCONTINUED | OUTPATIENT
Start: 2023-10-09 | End: 2023-10-09 | Stop reason: HOSPADM

## 2023-10-09 RX ORDER — ACETAMINOPHEN 500 MG
1000 TABLET ORAL ONCE
Status: COMPLETED | OUTPATIENT
Start: 2023-10-09 | End: 2023-10-09

## 2023-10-09 RX ADMIN — ACETAMINOPHEN 1000 MG: 500 TABLET, FILM COATED ORAL at 07:27

## 2023-10-09 RX ADMIN — LIDOCAINE HYDROCHLORIDE 100 MG: 20 INJECTION, SOLUTION EPIDURAL; INFILTRATION; INTRACAUDAL; PERINEURAL at 07:46

## 2023-10-09 RX ADMIN — ONDANSETRON 4 MG: 2 INJECTION INTRAMUSCULAR; INTRAVENOUS at 08:03

## 2023-10-09 RX ADMIN — CEFAZOLIN SODIUM 1 G: 1 INJECTION, SOLUTION INTRAVENOUS at 07:50

## 2023-10-09 RX ADMIN — SODIUM CHLORIDE, POTASSIUM CHLORIDE, SODIUM LACTATE AND CALCIUM CHLORIDE 1000 ML: 600; 310; 30; 20 INJECTION, SOLUTION INTRAVENOUS at 06:50

## 2023-10-09 RX ADMIN — PROPOFOL 200 MG: 10 INJECTION, EMULSION INTRAVENOUS at 07:46

## 2023-10-09 NOTE — OP NOTE
"Operative Summary    Carmencita Napier  Date of Procedure: 10/9/2023    Pre-op Diagnosis:   Stress incontinence [N39.3]    Post-op Diagnosis:     Post-Op Diagnosis Codes:     * Stress incontinence [N39.3]    Procedure/CPTr Codes:      Procedure(s):  CYSTOSCOPY WITH BULKING AGENT INJECTION    Surgeon(s):  Andre Lowe MD    Anesthesia: General    Staff:   Circulator: Effie Arenas RN; Freddie Otero RN  Scrub Person: Jillian Young; Yanci Keys    Indications for procedure:  Stress incontinence    Findings:   Satisfactory coaptation of urethral \"blebs\" in proximal urethra at conclusion of case    Procedure details:  After appropriate anesthesia, positioning, prep and drape, timeout protocol was observed.     Patient is in the dorsal lithotomy position.  The 0 degree urethroscope is inserted through the rotating sheath. The bladder is filled with about 150 mL of sterile water through the cystoscope sheath.  Inside the bladder this sheath is rotated to where the needle will be passed at the 7 o'clock position after the first 1 mL Bulkamid syringe with needle is passed through the rotating sheath.  The needle was left 2 cm out of the sheath.  At this point the entire system is pulled distally so that the tip of the needle was now at the bladder neck.  At this point the needle was retracted back into the sheath at the 7 o'clock position.  The needle is then passed parallel to the scope and urethra until the needle is seen perforating the urethra to 1 cm being in the appropriate layer to inject the hydrogel.  I injected approximately 0.3 mL at which point I could see the bleb created approaching in the midline.  After retracting the needle inside the sheath, I rotated the sheath to the 11 o'clock position in a clockwise fashion.  In a like manner I injected the hydrogel until I saw the bleb across the midline.  Each time the needle was then retracted and rotated to the next injection site which was 2 o'clock and 5 " o'clock positions. The latter 2 injections were completed with another 1 mL Bulkamid syringe. I used 2 mL total.  Hydrogel appeared to be nicely coapting the urothelium so that I could not even see into the bladder.  At this point I took an 8 [place] red rubber catheter and drained out approximately 300 mL sterile water to leave the patient with about 300 mL to void postoperatively.    Estimated Blood Loss: <30 mL    Specimens:                None      Drains: none    Complications: none    Plan: Patient to return to see me in office in six weeks provided voiding trial is successful in outpatient surgery      (Please note that portions of this note were completed with a voice recognition program.)  Andre Lowe MD     Date: 10/9/2023  Time: 08:11 CDT

## 2023-10-09 NOTE — H&P
Urology H&P    Ms. Napier is 62 y.o. female    CHIEF COMPLAINT: I leak urine    HPI  The patient is here today to address the stress component of her incontinence.  She reports no dysuria or other worrisome symptoms of infection    The following portions of the patient's history were reviewed and updated as appropriate: allergies, current medications, past family history, past medical history, past social history, past surgical history and problem list.    Review of Systems      Medications Prior to Admission   Medication Sig Dispense Refill Last Dose    acetaminophen (TYLENOL) 500 MG tablet Take 2 tablets by mouth Every 6 (Six) Hours As Needed for Mild Pain. 12 tablet 0     ARIPiprazole (ABILIFY) 2 MG tablet Take 1 tablet by mouth Daily.       busPIRone (BUSPAR) 10 MG tablet Take 1.5 tablets by mouth 2 (Two) Times a Day.       ergocalciferol (ERGOCALCIFEROL) 1.25 MG (11217 UT) capsule Take 1 capsule by mouth 1 (One) Time Per Week.       escitalopram (LEXAPRO) 5 MG tablet Take 1 tablet by mouth Every Morning.       estradiol (ESTRACE) 0.1 MG/GM vaginal cream Insert 2 g into the vagina 2 (Two) Times a Week. 42.5 g 12     levothyroxine (SYNTHROID, LEVOTHROID) 25 MCG tablet Take 1 tablet by mouth Every Morning.       oxybutynin XL (DITROPAN-XL) 10 MG 24 hr tablet Take 1 tablet by mouth Daily. 90 tablet 3     pantoprazole (PROTONIX) 40 MG EC tablet Take 1 tablet by mouth Daily.       prazosin (MINIPRESS) 2 MG capsule Take 1 capsule by mouth Every Night.       QUEtiapine (SEROquel) 50 MG tablet Take 2 tablets by mouth Every Night.       topiramate (TOPAMAX) 50 MG tablet Take 1 tablet by mouth Daily.          No current facility-administered medications for this encounter.    Past Medical History:   Diagnosis Date    Anxiety     GERD (gastroesophageal reflux disease)     History of migraine     Hypertension     Hypothyroidism     Lesion of urinary bladder 08/2023    Recurrent UTI (urinary tract infection)        Past  "Surgical History:   Procedure Laterality Date    BREAST BIOPSY Bilateral     benign    CYSTOSCOPY BLADDER BIOPSY N/A 8/16/2023    Procedure: CYSTOSCOPY with BLADDER BIOPSY;  Surgeon: Andre Lowe MD;  Location: [facility] OR;  Service: Urology;  Laterality: N/A;    HYSTERECTOMY      OOPHORECTOMY Bilateral     STOMACH SURGERY      TIF PROCEDURE       Social History     Socioeconomic History    Marital status:    Tobacco Use    Smoking status: Never     Passive exposure: Never    Smokeless tobacco: Never   Vaping Use    Vaping Use: Never used   Substance and Sexual Activity    Alcohol use: Not Currently    Drug use: Never    Sexual activity: Defer     Birth control/protection: Hysterectomy       Family History   Problem Relation Age of Onset    Breast cancer Maternal Grandmother        There were no vitals taken for this visit.    Physical Exam      Lab Results   Component Value Date    GLUCOSE 88 10/02/2023    BUN 14 10/02/2023    CREATININE 0.91 10/02/2023    EGFRIFNONA >60 06/03/2019    BCR 15.4 10/02/2023    CO2 24.0 10/02/2023    CALCIUM 8.8 10/02/2023     Lab Results   Component Value Date    GLUCOSE 88 10/02/2023    CALCIUM 8.8 10/02/2023     10/02/2023    K 3.6 10/02/2023    CO2 24.0 10/02/2023     (H) 10/02/2023    BUN 14 10/02/2023    CREATININE 0.91 10/02/2023    EGFRIFNONA >60 06/03/2019    BCR 15.4 10/02/2023    ANIONGAP 10.0 10/02/2023     Lab Results   Component Value Date    WBC 6.28 10/02/2023    HGB 13.1 10/02/2023    HCT 43.1 10/02/2023    MCV 90.2 10/02/2023     10/02/2023     No results found for: \"PSA\"  No results found for: \"URINECX\"  Brief Urine Lab Results  (Last result in the past 365 days)        Color   Clarity   Blood   Leuk Est   Nitrite   Protein   CREAT   Urine HCG        08/14/23 1415 Yellow   Clear   Moderate   Trace   Negative   30 mg/dL                     Imaging Results (Last 7 Days)       ** No results found for the last 168 hours. **      "       Assessment and Plan  Incontinence, stress    Patient is here to undergo treatment for urinary stress incontinence.  We will do a Bulkamid injection.  Reviewed the rationale, alternative options, benefits and risks of this procedure were discussed at previous office visit.  Patient voiced no additional questions today.      (Please note that portions of this note were completed with a voice recognition program.)  Andre Lowe MD  10/09/23  05:49 CDT

## 2023-10-09 NOTE — ANESTHESIA POSTPROCEDURE EVALUATION
Patient: Carmencita Napier    Procedure Summary       Date: 10/09/23 Room / Location:  [facility]  /  [facility]    Anesthesia Start: 0741 Anesthesia Stop: 0812    Procedure: CYSTOSCOPY WITH BULKING AGENT INJECTION (Bladder) Diagnosis:       Stress incontinence      (Stress incontinence [N39.3])    Surgeons: Andre Lowe MD Provider: Orlando Mendoza CRNA    Anesthesia Type: general ASA Status: 2            Anesthesia Type: general    Vitals  Vitals Value Taken Time   /78 10/09/23 0835   Temp 97.4 øF (36.3 øC) 10/09/23 0824   Pulse 65 10/09/23 0835   Resp 16 10/09/23 0835   SpO2 99 % 10/09/23 0835           Post Anesthesia Care and Evaluation    Patient location during evaluation: PHASE II  Patient participation: complete - patient participated  Level of consciousness: awake and awake and alert  Pain score: 0  Pain management: adequate    Airway patency: patent  Anesthetic complications: No anesthetic complications  PONV Status: none  Cardiovascular status: acceptable  Respiratory status: acceptable  Hydration status: acceptable    Comments: Patient discharged according to acceptable Celia score per RN assessment. See nursing records for further information.     Blood pressure 114/67, pulse 60, temperature 97.4 øF (36.3 øC), resp. rate 16, SpO2 93%.

## 2023-10-09 NOTE — ANESTHESIA PROCEDURE NOTES
Airway  Urgency: elective    Date/Time: 10/9/2023 7:47 AM  Airway not difficult    General Information and Staff    Patient location during procedure: OR  CRNA/CAA: Orlando Mendoza CRNA    Indications and Patient Condition    Preoxygenated: yes  Mask difficulty assessment: 1 - vent by mask    Final Airway Details  Final airway type: supraglottic airway      Successful airway: Size 3     Number of attempts at approach: 1  Assessment: lips, teeth, and gum same as pre-op and atraumatic intubation

## 2023-11-10 NOTE — PROGRESS NOTES
Chief Complaint  Stress Incontinence     Subjective          Carmencita Napier presents to [facility] UR[facility] to follow up Bulkamid injection. Good stream. No pads now!        Current Outpatient Medications:     acetaminophen (TYLENOL) 500 MG tablet, Take 2 tablets by mouth Every 6 (Six) Hours As Needed for Mild Pain., Disp: 12 tablet, Rfl: 0    ARIPiprazole (ABILIFY) 2 MG tablet, Take 1 tablet by mouth Daily., Disp: , Rfl:     busPIRone (BUSPAR) 10 MG tablet, Take 1.5 tablets by mouth 2 (Two) Times a Day., Disp: , Rfl:     ergocalciferol (ERGOCALCIFEROL) 1.25 MG (70953 UT) capsule, Take 1 capsule by mouth 1 (One) Time Per Week., Disp: , Rfl:     escitalopram (LEXAPRO) 5 MG tablet, Take 1 tablet by mouth Every Morning., Disp: , Rfl:     estradiol (ESTRACE) 0.1 MG/GM vaginal cream, Insert 2 g into the vagina 2 (Two) Times a Week., Disp: 42.5 g, Rfl: 12    HYDROcodone-acetaminophen (NORCO) 5-325 MG per tablet, Take 1 tablet by mouth Every 8 (Eight) Hours As Needed for Moderate Pain (Pain)., Disp: 6 tablet, Rfl: 0    levothyroxine (SYNTHROID, LEVOTHROID) 25 MCG tablet, Take 1 tablet by mouth Every Morning., Disp: , Rfl:     oxybutynin XL (DITROPAN-XL) 10 MG 24 hr tablet, Take 1 tablet by mouth Daily., Disp: 90 tablet, Rfl: 3    pantoprazole (PROTONIX) 40 MG EC tablet, Take 1 tablet by mouth Daily., Disp: , Rfl:     prazosin (MINIPRESS) 2 MG capsule, Take 1 capsule by mouth Every Night., Disp: , Rfl:     QUEtiapine (SEROquel) 50 MG tablet, Take 2 tablets by mouth Every Night., Disp: , Rfl:     topiramate (TOPAMAX) 50 MG tablet, Take 1 tablet by mouth Daily., Disp: , Rfl:   Past Medical History:   Diagnosis Date    Anxiety     GERD (gastroesophageal reflux disease)     History of migraine     Hypertension     Hypothyroidism     Lesion of urinary bladder 08/2023    Recurrent UTI (urinary tract infection)      Past Surgical History:   Procedure Laterality Date    BREAST BIOPSY Bilateral     benign    CYSTOSCOPY  "BLADDER BIOPSY N/A 8/16/2023    Procedure: CYSTOSCOPY with BLADDER BIOPSY;  Surgeon: Andre Lowe MD;  Location:  [facility];  Service: Urology;  Laterality: N/A;    CYSTOSCOPY W/ BULKING AGENT INJECTION N/A 10/9/2023    Procedure: CYSTOSCOPY WITH BULKING AGENT INJECTION;  Surgeon: Andre Lowe MD;  Location:  [facility] OR;  Service: Urology;  Laterality: N/A;    HYSTERECTOMY      OOPHORECTOMY Bilateral     STOMACH SURGERY      TIF PROCEDURE           Review of Systems      Objective   PHYSICAL EXAM  Vital Signs:   Temp 96.3 °F (35.7 °C)   Ht 167.6 cm (66\")   Wt 59.9 kg (132 lb 1.6 oz)   BMI 21.32 kg/m²     Physical Exam      DATA  Result Review :              Results for orders placed or performed in visit on 10/02/23   CBC (No Diff)    Specimen: Blood   Result Value Ref Range    WBC 6.28 3.40 - 10.80 10*3/mm3    RBC 4.78 3.77 - 5.28 10*6/mm3    Hemoglobin 13.1 12.0 - 15.9 g/dL    Hematocrit 43.1 34.0 - 46.6 %    MCV 90.2 79.0 - 97.0 fL    MCH 27.4 26.6 - 33.0 pg    MCHC 30.4 (L) 31.5 - 35.7 g/dL    RDW 13.9 12.3 - 15.4 %    RDW-SD 46.0 37.0 - 54.0 fl    MPV 10.5 6.0 - 12.0 fL    Platelets 246 140 - 450 10*3/mm3   Basic Metabolic Panel    Specimen: Blood   Result Value Ref Range    Glucose 88 65 - 99 mg/dL    BUN 14 8 - 23 mg/dL    Creatinine 0.91 0.57 - 1.00 mg/dL    Sodium 143 136 - 145 mmol/L    Potassium 3.6 3.5 - 5.2 mmol/L    Chloride 109 (H) 98 - 107 mmol/L    CO2 24.0 22.0 - 29.0 mmol/L    Calcium 8.8 8.6 - 10.5 mg/dL    BUN/Creatinine Ratio 15.4 7.0 - 25.0    Anion Gap 10.0 5.0 - 15.0 mmol/L    eGFR 72.4 >60.0 mL/min/1.73            ASSESSMENT AND PLAN          Problem List Items Addressed This Visit    None  Visit Diagnoses       Mixed incontinence    -  Primary        Much better after Bulkamid.   Follow up in a year.         FOLLOW UP     Return in about 1 year (around 11/21/2024).        (Please note that portions of this note were completed with a voice recognition program.)  Andre Lowe, " MD  11/21/23  13:43 CST

## 2023-11-21 ENCOUNTER — OFFICE VISIT (OUTPATIENT)
Dept: UROLOGY | Facility: CLINIC | Age: 63
End: 2023-11-21
Payer: MEDICARE

## 2023-11-21 VITALS — BODY MASS INDEX: 21.23 KG/M2 | TEMPERATURE: 96.3 F | WEIGHT: 132.1 LBS | HEIGHT: 66 IN

## 2023-11-21 DIAGNOSIS — N39.46 MIXED INCONTINENCE: Primary | ICD-10-CM

## 2023-11-21 PROCEDURE — 1160F RVW MEDS BY RX/DR IN RCRD: CPT | Performed by: UROLOGY

## 2023-11-21 PROCEDURE — 99212 OFFICE O/P EST SF 10 MIN: CPT | Performed by: UROLOGY

## 2023-11-21 PROCEDURE — 1159F MED LIST DOCD IN RCRD: CPT | Performed by: UROLOGY

## 2023-12-20 ENCOUNTER — LAB REQUISITION (OUTPATIENT)
Dept: LAB | Facility: HOSPITAL | Age: 63
End: 2023-12-20
Payer: MEDICARE

## 2023-12-20 PROCEDURE — 88305 TISSUE EXAM BY PATHOLOGIST: CPT | Performed by: SURGERY

## 2023-12-23 LAB
CYTO UR: NORMAL
LAB AP CASE REPORT: NORMAL
LAB AP CLINICAL INFORMATION: NORMAL
LAB AP DIAGNOSIS COMMENT: NORMAL
Lab: NORMAL
PATH REPORT.FINAL DX SPEC: NORMAL
PATH REPORT.GROSS SPEC: NORMAL

## 2025-04-21 ENCOUNTER — APPOINTMENT (OUTPATIENT)
Dept: GENERAL RADIOLOGY | Age: 65
End: 2025-04-21
Payer: MEDICARE

## 2025-04-21 ENCOUNTER — APPOINTMENT (OUTPATIENT)
Dept: CT IMAGING | Age: 65
End: 2025-04-21
Payer: MEDICARE

## 2025-04-21 ENCOUNTER — TELEPHONE (OUTPATIENT)
Age: 65
End: 2025-04-21

## 2025-04-21 ENCOUNTER — HOSPITAL ENCOUNTER (EMERGENCY)
Age: 65
Discharge: HOME | End: 2025-04-21
Payer: MEDICARE

## 2025-04-21 VITALS
HEART RATE: 85 BPM | WEIGHT: 138 LBS | RESPIRATION RATE: 18 BRPM | TEMPERATURE: 98.2 F | SYSTOLIC BLOOD PRESSURE: 113 MMHG | OXYGEN SATURATION: 100 % | BODY MASS INDEX: 22.18 KG/M2 | DIASTOLIC BLOOD PRESSURE: 84 MMHG | HEIGHT: 66 IN

## 2025-04-21 DIAGNOSIS — M25.422 EFFUSION OF LEFT ELBOW: Primary | ICD-10-CM

## 2025-04-21 DIAGNOSIS — S52.124A CLOSED NONDISPLACED FRACTURE OF HEAD OF RIGHT RADIUS, INITIAL ENCOUNTER: ICD-10-CM

## 2025-04-21 PROCEDURE — 73200 CT UPPER EXTREMITY W/O DYE: CPT

## 2025-04-21 PROCEDURE — 73080 X-RAY EXAM OF ELBOW: CPT

## 2025-04-21 PROCEDURE — 99284 EMERGENCY DEPT VISIT MOD MDM: CPT

## 2025-04-21 PROCEDURE — 29105 APPLICATION LONG ARM SPLINT: CPT

## 2025-04-21 RX ORDER — HYDROCODONE BITARTRATE AND ACETAMINOPHEN 5; 325 MG/1; MG/1
1 TABLET ORAL EVERY 6 HOURS PRN
Qty: 12 TABLET | Refills: 0 | Status: SHIPPED | OUTPATIENT
Start: 2025-04-21 | End: 2025-04-24

## 2025-04-21 ASSESSMENT — ENCOUNTER SYMPTOMS
COLOR CHANGE: 0
SORE THROAT: 0
NAUSEA: 0
ABDOMINAL DISTENTION: 0
RHINORRHEA: 0
PHOTOPHOBIA: 0
EYE PAIN: 0
SHORTNESS OF BREATH: 0
APNEA: 0
ABDOMINAL PAIN: 0
COUGH: 0
EYE DISCHARGE: 0
BACK PAIN: 0

## 2025-04-21 NOTE — ED PROVIDER NOTES
[facility], [place] EMERGENCY DEPARTMENT  eMERGENCYdEPARTMENT eNCOUnter      Pt Name: Carly Dickinson  MRN: [id number]  Birthdate 1960  Date of evaluation: 4/21/2025  Provider:BRAULIO Feliz    CHIEF COMPLAINT       Chief Complaint   Patient presents with    Elbow Injury     States that she fell on Saturday, was seen at [facility] yesterday diagnosed with a dislocated elbow, states they did not fix it, presents with arm in sling          HISTORY OF PRESENT ILLNESS  (Location/Symptom, Timing/Onset, Context/Setting, Quality, Duration, Modifying Factors, Severity.)   Carly Dickinson is a 64 y.o. female who presents to the emergency department with complaints of left elbow pain post fall Saturday morning. She hit her face as well had negative CT head and left XR. The report states left elbow nondisplaced fracture of coronoid process. Prior fracture when she was \"a little girl\" noted scar lateral elbow. Mild swelling on exam denies pain to left wrist hand or shoulder. States she went to ortho urgent care \"they dont do that anymore\" she went to urgent care here \"they dont take my insurance.\" NV intact. Right hand dominant.     HPI    Nursing Notes were reviewed and I agree.    REVIEW OF SYSTEMS    (2-9 systems for level 4, 10 or more for level 5)     Review of Systems   Constitutional:  Negative for activity change, appetite change, chills and fever.   HENT:  Negative for congestion, postnasal drip, rhinorrhea and sore throat.    Eyes:  Negative for photophobia, pain, discharge and visual disturbance.   Respiratory:  Negative for apnea, cough and shortness of breath.    Cardiovascular:  Negative for chest pain and leg swelling.   Gastrointestinal:  Negative for abdominal distention, abdominal pain and nausea.   Genitourinary:  Negative for vaginal bleeding.   Musculoskeletal:  Positive for arthralgias. Negative for back pain, joint swelling, neck pain and neck stiffness.   Skin:  Negative for color change and rash.   Neurological:

## 2025-04-23 NOTE — PROGRESS NOTES
Orthopaedic Clinic Note    NAME:  Carly Dickinson   : 1960  MRN: [id number]      2025     CHIEF COMPLAINT:  Left elbow pain      HISTORY OF PRESENT ILLNESS:   Carly is a 64 y.o. female who presents to the office for evaluation and treatment of the Left elbow.  She was seen in the emergency department on 2025.  She had fallen 2 days prior on 2025 and was seen at an outside facility and diagnosed with a dislocated elbow.  While in the ER she was found to have a fracture involving the capitellum and placed into a long-arm splint.    Past Medical History:        Diagnosis Date    Arthritis     Depression with anxiety     GERD (gastroesophageal reflux disease)     Hypothyroidism     Migraines        Past Surgical History:        Procedure Laterality Date    BREAST BIOPSY      bilateral biospy = 8 - 9 times    CHOLECYSTECTOMY      COLON SURGERY      scar tissue removed    COLONOSCOPY      ENDOSCOPY, COLON, DIAGNOSTIC      FRACTURE SURGERY      HERNIA REPAIR  2016    HYSTERECTOMY (CERVIX STATUS UNKNOWN)      TUBAL LIGATION      UPPER GASTROINTESTINAL ENDOSCOPY         Current Medications:   Prior to Admission medications    Medication Sig Start Date End Date Taking? Authorizing Provider   escitalopram (LEXAPRO) 10 MG tablet  25  Yes Epi Haddad MD   ARIPiprazole (ABILIFY) 5 MG tablet  25  Yes Epi Haddad MD   QUEtiapine (SEROQUEL) 200 MG tablet  25  Yes Epi Haddad MD   topiramate (TOPAMAX) 100 MG tablet  25  Yes Epi Haddad MD   Vitamin D3 125 MCG (5000 UT) TABS tablet Take 1 tablet by mouth daily   Yes Epi Haddad MD   Cyanocobalamin (VITAMIN B 12) 500 MCG TABS Place 1 tablet under the tongue MICROLOZ SUB   Yes Epi Haddad MD   levothyroxine (SYNTHROID) 50 MCG tablet Take 0.5 tablets by mouth Daily   Yes Epi Haddad MD   buPROPion (WELLBUTRIN SR) 100 MG extended release tablet Take 1 tablet by mouth 2 times

## 2025-04-25 ENCOUNTER — OFFICE VISIT (OUTPATIENT)
Age: 65
End: 2025-04-25
Payer: MEDICARE

## 2025-04-25 VITALS — BODY MASS INDEX: 21.5 KG/M2 | WEIGHT: 133.8 LBS | HEIGHT: 66 IN

## 2025-04-25 DIAGNOSIS — S42.452A CLOSED FRACTURE OF CAPITELLUM OF DISTAL HUMERUS, LEFT, INITIAL ENCOUNTER: Primary | ICD-10-CM

## 2025-04-25 PROCEDURE — 99203 OFFICE O/P NEW LOW 30 MIN: CPT | Performed by: ORTHOPAEDIC SURGERY

## 2025-04-25 RX ORDER — ESCITALOPRAM OXALATE 10 MG/1
TABLET ORAL
COMMUNITY
Start: 2025-04-07

## 2025-04-25 RX ORDER — QUETIAPINE FUMARATE 200 MG/1
TABLET, FILM COATED ORAL
COMMUNITY
Start: 2025-04-17

## 2025-04-25 RX ORDER — ARIPIPRAZOLE 5 MG/1
TABLET ORAL
COMMUNITY
Start: 2025-04-02

## 2025-04-25 RX ORDER — CYANOCOBALAMIN (VITAMIN B-12) 500 MCG
1 TABLET ORAL
COMMUNITY

## 2025-04-25 RX ORDER — TOPIRAMATE 100 MG/1
TABLET, FILM COATED ORAL
COMMUNITY
Start: 2025-04-08

## 2025-05-22 NOTE — PROGRESS NOTES
Orthopaedic Clinic Note-Established Patient     NAME:  Carly Dickinson   : 1960  MRN: [id number]      2025     CHIEF COMPLAINT:  Left elbow pain      HISTORY OF PRESENT ILLNESS:   Carly is a 64 y.o. female who presents to the office for evaluation and treatment of the Left elbow.  She was seen in the emergency department on 2025.  She had fallen 2 days prior on 2025 and was seen at an outside facility and diagnosed with a dislocated elbow.  While in the ER she was found to have a fracture involving the capitellum and placed into a long-arm splint.  Dr Miranda first saw her on 2025.  At that visit, the patient was removed from immobilization and transition to a sling.  She was encouraged to perform gradual range of motion of the elbow to help regain extension.  She is back today for reevaluation.  She notes full range of motion and denies any pain.  She has began to start putting weight back into the left upper extremity.    Past Medical History:        Diagnosis Date    Arthritis     Depression with anxiety     GERD (gastroesophageal reflux disease)     Hypothyroidism     Migraines        Past Surgical History:        Procedure Laterality Date    BREAST BIOPSY      bilateral biospy = 8 - 9 times    CHOLECYSTECTOMY      COLON SURGERY      scar tissue removed    COLONOSCOPY      ENDOSCOPY, COLON, DIAGNOSTIC      FRACTURE SURGERY      HERNIA REPAIR  2016    HYSTERECTOMY (CERVIX STATUS UNKNOWN)      TUBAL LIGATION      UPPER GASTROINTESTINAL ENDOSCOPY         Current Medications:   Prior to Admission medications    Medication Sig Start Date End Date Taking? Authorizing Provider   escitalopram (LEXAPRO) 10 MG tablet  25  Yes Provider, MD Epi   ARIPiprazole (ABILIFY) 5 MG tablet  25  Yes Provider, MD Epi   QUEtiapine (SEROQUEL) 200 MG tablet  25  Yes ProviderEpi MD   topiramate (TOPAMAX) 100 MG tablet  25  Yes Provider, MD Epi   Vitamin D3 125

## 2025-05-23 ENCOUNTER — OFFICE VISIT (OUTPATIENT)
Age: 65
End: 2025-05-23
Payer: MEDICARE

## 2025-05-23 VITALS — HEIGHT: 66 IN | WEIGHT: 137 LBS | BODY MASS INDEX: 22.02 KG/M2

## 2025-05-23 DIAGNOSIS — S42.452D CLOSED FRACTURE OF CAPITULUM OF LEFT HUMERUS WITH ROUTINE HEALING, SUBSEQUENT ENCOUNTER: Primary | ICD-10-CM

## 2025-05-23 PROCEDURE — 99213 OFFICE O/P EST LOW 20 MIN: CPT | Performed by: PHYSICIAN ASSISTANT

## (undated) DEVICE — GLV SURG BIOGEL M LTX PF 8

## (undated) DEVICE — BHS TURNOVER CYSTO: Brand: MEDLINE INDUSTRIES, INC.

## (undated) DEVICE — GOWN,NON-REINFORCED,SIRUS,SET IN SLV,XXL: Brand: MEDLINE

## (undated) DEVICE — PK CYSTO 30